# Patient Record
Sex: FEMALE | Race: BLACK OR AFRICAN AMERICAN | NOT HISPANIC OR LATINO
[De-identification: names, ages, dates, MRNs, and addresses within clinical notes are randomized per-mention and may not be internally consistent; named-entity substitution may affect disease eponyms.]

---

## 2018-09-14 ENCOUNTER — APPOINTMENT (OUTPATIENT)
Dept: SURGERY | Facility: CLINIC | Age: 38
End: 2018-09-14
Payer: COMMERCIAL

## 2018-09-14 VITALS
OXYGEN SATURATION: 99 % | TEMPERATURE: 99 F | RESPIRATION RATE: 16 BRPM | SYSTOLIC BLOOD PRESSURE: 101 MMHG | HEIGHT: 66 IN | BODY MASS INDEX: 24.91 KG/M2 | WEIGHT: 155 LBS | DIASTOLIC BLOOD PRESSURE: 62 MMHG | HEART RATE: 72 BPM

## 2018-09-14 PROCEDURE — 99244 OFF/OP CNSLTJ NEW/EST MOD 40: CPT

## 2018-10-30 ENCOUNTER — RECORD ABSTRACTING (OUTPATIENT)
Age: 38
End: 2018-10-30

## 2018-10-30 DIAGNOSIS — Z83.3 FAMILY HISTORY OF DIABETES MELLITUS: ICD-10-CM

## 2018-10-30 DIAGNOSIS — Z83.49 FAMILY HISTORY OF OTHER ENDOCRINE, NUTRITIONAL AND METABOLIC DISEASES: ICD-10-CM

## 2018-10-30 DIAGNOSIS — Z82.49 FAMILY HISTORY OF ISCHEMIC HEART DISEASE AND OTHER DISEASES OF THE CIRCULATORY SYSTEM: ICD-10-CM

## 2018-11-08 ENCOUNTER — APPOINTMENT (OUTPATIENT)
Dept: SURGERY | Facility: HOSPITAL | Age: 38
End: 2018-11-08

## 2018-11-08 ENCOUNTER — OTHER (OUTPATIENT)
Age: 38
End: 2018-11-08

## 2018-11-20 ENCOUNTER — OTHER (OUTPATIENT)
Age: 38
End: 2018-11-20

## 2018-11-29 ENCOUNTER — MEDICATION RENEWAL (OUTPATIENT)
Age: 38
End: 2018-11-29

## 2018-12-05 ENCOUNTER — APPOINTMENT (OUTPATIENT)
Dept: OBGYN | Facility: CLINIC | Age: 38
End: 2018-12-05
Payer: COMMERCIAL

## 2018-12-05 ENCOUNTER — LABORATORY RESULT (OUTPATIENT)
Age: 38
End: 2018-12-05

## 2018-12-05 VITALS
HEIGHT: 66 IN | BODY MASS INDEX: 26.2 KG/M2 | WEIGHT: 163 LBS | SYSTOLIC BLOOD PRESSURE: 116 MMHG | DIASTOLIC BLOOD PRESSURE: 62 MMHG

## 2018-12-05 DIAGNOSIS — R92.2 INCONCLUSIVE MAMMOGRAM: ICD-10-CM

## 2018-12-05 DIAGNOSIS — Z82.61 FAMILY HISTORY OF ARTHRITIS: ICD-10-CM

## 2018-12-05 DIAGNOSIS — Z80.9 FAMILY HISTORY OF MALIGNANT NEOPLASM, UNSPECIFIED: ICD-10-CM

## 2018-12-05 DIAGNOSIS — Z82.69 FAMILY HISTORY OF OTHER DISEASES OF THE MUSCULOSKELETAL SYSTEM AND CONNECTIVE TISSUE: ICD-10-CM

## 2018-12-05 DIAGNOSIS — Z84.89 FAMILY HISTORY OF OTHER SPECIFIED CONDITIONS: ICD-10-CM

## 2018-12-05 DIAGNOSIS — Z80.3 FAMILY HISTORY OF MALIGNANT NEOPLASM OF BREAST: ICD-10-CM

## 2018-12-05 DIAGNOSIS — T83.32XA DISPLACEMENT OF INTRAUTERINE CONTRACEPTIVE DEVICE, INITIAL ENCOUNTER: ICD-10-CM

## 2018-12-05 PROCEDURE — 99385 PREV VISIT NEW AGE 18-39: CPT

## 2018-12-10 LAB — CYTOLOGY CVX/VAG DOC THIN PREP: NORMAL

## 2018-12-14 ENCOUNTER — OTHER (OUTPATIENT)
Age: 38
End: 2018-12-14

## 2018-12-14 ENCOUNTER — APPOINTMENT (OUTPATIENT)
Dept: SURGERY | Facility: CLINIC | Age: 38
End: 2018-12-14

## 2018-12-17 PROBLEM — Z84.89 FAMILY HISTORY OF GENETIC DISORDER: Status: ACTIVE | Noted: 2018-09-14

## 2018-12-17 PROBLEM — Z82.61 FAMILY HISTORY OF ARTHRITIS: Status: ACTIVE | Noted: 2018-09-14

## 2018-12-17 PROBLEM — Z82.69 FAMILY HISTORY OF OSTEOARTHRITIS: Status: ACTIVE | Noted: 2018-09-14

## 2018-12-17 PROBLEM — Z80.9 FAMILY HISTORY OF MALIGNANT NEOPLASM: Status: ACTIVE | Noted: 2018-09-14

## 2018-12-18 ENCOUNTER — APPOINTMENT (OUTPATIENT)
Dept: ENDOCRINOLOGY | Facility: CLINIC | Age: 38
End: 2018-12-18

## 2019-02-21 ENCOUNTER — APPOINTMENT (OUTPATIENT)
Dept: INTERNAL MEDICINE | Facility: CLINIC | Age: 39
End: 2019-02-21

## 2019-02-25 ENCOUNTER — TRANSCRIPTION ENCOUNTER (OUTPATIENT)
Age: 39
End: 2019-02-25

## 2019-03-11 ENCOUNTER — APPOINTMENT (OUTPATIENT)
Dept: ENDOCRINOLOGY | Facility: CLINIC | Age: 39
End: 2019-03-11

## 2019-03-29 ENCOUNTER — APPOINTMENT (OUTPATIENT)
Dept: ENDOCRINOLOGY | Facility: CLINIC | Age: 39
End: 2019-03-29
Payer: COMMERCIAL

## 2019-03-29 VITALS
HEIGHT: 66 IN | BODY MASS INDEX: 27 KG/M2 | HEART RATE: 65 BPM | WEIGHT: 168 LBS | SYSTOLIC BLOOD PRESSURE: 100 MMHG | DIASTOLIC BLOOD PRESSURE: 60 MMHG

## 2019-03-29 PROCEDURE — 99213 OFFICE O/P EST LOW 20 MIN: CPT

## 2019-03-29 RX ORDER — METHIMAZOLE 10 MG/1
10 TABLET ORAL
Refills: 0 | Status: DISCONTINUED | COMMUNITY
End: 2019-03-29

## 2019-03-29 RX ORDER — AZITHROMYCIN 250 MG/1
250 TABLET, FILM COATED ORAL
Qty: 6 | Refills: 0 | Status: DISCONTINUED | COMMUNITY
Start: 2018-04-14 | End: 2019-03-29

## 2019-03-31 ENCOUNTER — LABORATORY RESULT (OUTPATIENT)
Age: 39
End: 2019-03-31

## 2019-04-03 ENCOUNTER — APPOINTMENT (OUTPATIENT)
Dept: OPHTHALMOLOGY | Facility: CLINIC | Age: 39
End: 2019-04-03
Payer: COMMERCIAL

## 2019-04-03 PROCEDURE — 92133 CPTRZD OPH DX IMG PST SGM ON: CPT

## 2019-04-03 PROCEDURE — 92083 EXTENDED VISUAL FIELD XM: CPT

## 2019-04-03 PROCEDURE — 99204 OFFICE O/P NEW MOD 45 MIN: CPT

## 2019-04-15 ENCOUNTER — APPOINTMENT (OUTPATIENT)
Dept: OPHTHALMOLOGY | Facility: CLINIC | Age: 39
End: 2019-04-15

## 2019-05-08 ENCOUNTER — APPOINTMENT (OUTPATIENT)
Dept: OBGYN | Facility: CLINIC | Age: 39
End: 2019-05-08

## 2019-05-23 ENCOUNTER — APPOINTMENT (OUTPATIENT)
Dept: ENDOCRINOLOGY | Facility: CLINIC | Age: 39
End: 2019-05-23

## 2019-05-28 ENCOUNTER — APPOINTMENT (OUTPATIENT)
Dept: INTERNAL MEDICINE | Facility: CLINIC | Age: 39
End: 2019-05-28

## 2019-06-03 ENCOUNTER — RX RENEWAL (OUTPATIENT)
Age: 39
End: 2019-06-03

## 2019-06-26 ENCOUNTER — NON-APPOINTMENT (OUTPATIENT)
Age: 39
End: 2019-06-26

## 2019-06-26 ENCOUNTER — APPOINTMENT (OUTPATIENT)
Dept: INTERNAL MEDICINE | Facility: CLINIC | Age: 39
End: 2019-06-26
Payer: COMMERCIAL

## 2019-06-26 VITALS
DIASTOLIC BLOOD PRESSURE: 70 MMHG | HEART RATE: 68 BPM | BODY MASS INDEX: 27.97 KG/M2 | TEMPERATURE: 99.6 F | SYSTOLIC BLOOD PRESSURE: 112 MMHG | WEIGHT: 174 LBS | HEIGHT: 66 IN

## 2019-06-26 DIAGNOSIS — Z86.39 PERSONAL HISTORY OF OTHER ENDOCRINE, NUTRITIONAL AND METABOLIC DISEASE: ICD-10-CM

## 2019-06-26 DIAGNOSIS — H40.003 PREGLAUCOMA, UNSPECIFIED, BILATERAL: ICD-10-CM

## 2019-06-26 PROCEDURE — 93000 ELECTROCARDIOGRAM COMPLETE: CPT

## 2019-06-26 PROCEDURE — 36415 COLL VENOUS BLD VENIPUNCTURE: CPT

## 2019-06-26 PROCEDURE — 99214 OFFICE O/P EST MOD 30 MIN: CPT | Mod: 25

## 2019-06-26 RX ORDER — PROPRANOLOL HYDROCHLORIDE 80 MG/1
80 CAPSULE, EXTENDED RELEASE ORAL
Refills: 0 | Status: DISCONTINUED | COMMUNITY
End: 2019-06-26

## 2019-06-28 LAB
APTT BLD: 32.3 SEC
BASOPHILS # BLD AUTO: 0.03 K/UL
BASOPHILS NFR BLD AUTO: 0.5 %
EOSINOPHIL # BLD AUTO: 0.16 K/UL
EOSINOPHIL NFR BLD AUTO: 2.6 %
HCT VFR BLD CALC: 29.6 %
HGB BLD-MCNC: 9.2 G/DL
IMM GRANULOCYTES NFR BLD AUTO: 0.2 %
INR PPP: 0.91 RATIO
LYMPHOCYTES # BLD AUTO: 3.15 K/UL
LYMPHOCYTES NFR BLD AUTO: 50.8 %
MAN DIFF?: NORMAL
MCHC RBC-ENTMCNC: 28.9 PG
MCHC RBC-ENTMCNC: 31.1 GM/DL
MCV RBC AUTO: 93.1 FL
MONOCYTES # BLD AUTO: 0.33 K/UL
MONOCYTES NFR BLD AUTO: 5.3 %
NEUTROPHILS # BLD AUTO: 2.52 K/UL
NEUTROPHILS NFR BLD AUTO: 40.6 %
PLATELET # BLD AUTO: 381 K/UL
PT BLD: 10.4 SEC
RBC # BLD: 3.18 M/UL
RBC # FLD: 14.3 %
WBC # FLD AUTO: 6.2 K/UL

## 2019-06-28 NOTE — ASSESSMENT
[Patient Optimized for Surgery] : Patient optimized for surgery [No Further Testing Recommended] : no further testing recommended [As per surgery] : as per surgery [FreeTextEntry4] : Ms. CHRISTY is a 39 year yo female with no h/o CAD and good exercise tolerance. She denies CP, palpitation or SOB and her EKG today is normal. She does not take blood thinners and denies sleep apnea or urinary obstruction symptoms. She does not have a h/o DVT. She will continue the prescription medications perioperatively as instructed. The morning of the procedure she will not take any pills\par \par Ms. CHRISTY has a moderate risk for perioperative cardiovascular complications and will undergo the procedure as planned. I will follow her postop.\par \par  ***More than 50% of the face to face time was devoted to counseling and/or coordination of care. The discussion and/or coordination of care included: perioperative medication management.\par \par

## 2019-06-28 NOTE — ADDENDUM
[FreeTextEntry1] : hgb 9.2 , ok for ambulatory surgery /low risk procedure\par will f/u c me after procedure

## 2019-06-28 NOTE — PHYSICAL EXAM
[Normal] : normal gait, coordination grossly intact, no focal deficits [de-identified] : skin rash neck [de-identified] : bilat exophtalmos and R upper lid lag

## 2019-06-28 NOTE — REVIEW OF SYSTEMS
[Skin Rash] : skin rash [Negative] : Neurological [Itching] : no itching [FreeTextEntry3] : b [de-identified] : dry skin

## 2019-06-28 NOTE — HISTORY OF PRESENT ILLNESS
[No Pertinent Cardiac History] : no history of aortic stenosis, atrial fibrillation, coronary artery disease, recent myocardial infarction, or implantable device/pacemaker [No Pertinent Pulmonary History] : no history of asthma, COPD, sleep apnea, or smoking [No Adverse Anesthesia Reaction] : no adverse anesthesia reaction in self or family member [(Patient denies any chest pain, claudication, dyspnea on exertion, orthopnea, palpitations or syncope)] : Patient denies any chest pain, claudication, dyspnea on exertion, orthopnea, palpitations or syncope [Good (7-10 METs)] : Good (7-10 METs) [Chronic Anticoagulation] : no chronic anticoagulation [Chronic Kidney Disease] : no chronic kidney disease [Diabetes] : no diabetes [FreeTextEntry1] : eyelid surgery [FreeTextEntry3] : Dr Yeager [FreeTextEntry2] : 7/9/19 [FreeTextEntry4] : Erlin comes in today for preoperative evaluation prior to another intervention on the right upper eyelid to correct exophthalmos and lid lag  in the context of Graves dz\par She had thyroidectomy last year and now she is on Synthroid. She feels well

## 2019-07-29 ENCOUNTER — LABORATORY RESULT (OUTPATIENT)
Age: 39
End: 2019-07-29

## 2019-07-29 ENCOUNTER — APPOINTMENT (OUTPATIENT)
Dept: ENDOCRINOLOGY | Facility: CLINIC | Age: 39
End: 2019-07-29
Payer: COMMERCIAL

## 2019-07-29 VITALS
SYSTOLIC BLOOD PRESSURE: 110 MMHG | BODY MASS INDEX: 27.32 KG/M2 | WEIGHT: 170 LBS | HEIGHT: 66 IN | HEART RATE: 63 BPM | DIASTOLIC BLOOD PRESSURE: 78 MMHG

## 2019-07-29 DIAGNOSIS — Z00.00 ENCOUNTER FOR GENERAL ADULT MEDICAL EXAMINATION W/OUT ABNORMAL FINDINGS: ICD-10-CM

## 2019-07-29 PROCEDURE — 99214 OFFICE O/P EST MOD 30 MIN: CPT

## 2019-07-29 NOTE — HISTORY OF PRESENT ILLNESS
[FreeTextEntry1] : \par July 29, 2019\par \par PCP: Dr. Nalini Noel \par  Gyn: [facility] \par  Surgeon: Dr. Kyle Melchor \par  Eyes:  Dr. Berry Henderson - ref to glaucoma doctor as well as at [facility] for lid retraction OS and will go to [facility] Nov 8, 2019\par .\par  CC: Hyperthyroid (Graves' disease with ophthalmopathy) \par  11/9/2018: thyroidectomy \par \par 38 yo working night shift at [employer].  \par Now taking levothyroxine 125 mcg daily.\par No double vision, blurry vision   \par \par Impression:  Thyroid surgery was November and by March TSH was 2.57 on levothyroxine\par 125 mcg daily.\par \par Plan:  TSI, TBII, TFTs today and ROV in six months.   \par \par \par \par \par \par March 29, 2019\par \par  PCP: Dr. Nalini Noel (to see)\par  Gyn: [facility] (to see)\par  Surgeon: Dr. Kyle Melchor \par .\par  CC: Hyperthyroid (Graves' disease with ophthalmopathy) \par  11/9/2018: thyroidectomy \par \par On levothyroxine 125 mcg daily post thyroidectomy in setting of hyperthyroidism due to Graves' Disease with ophthalmopathy. \par \par November 9, 2018:\par  "FINAL DIAGNOSIS\par  \par A. Delphian lymph node, excision:\par 	One benign reactive lymph node.\par \par B. Thyroid gland, total thyroidectomy:\par 	Thyroid tissue showing areas of mild hyperplastic change, mild fibrosis and few\par  minute hyperplastic-type nodules, consistent with treated diffuse hyperplasia\par  (Graves disease).	\par :"\par \par Impression: Graves' disease with ophthalmopathy post thyroidectomy.\par \par Plan: UPdated thyroid lab tests on April 1.\par See Dr. Berry Bah for her advice. \par ROV here in about 8 weeks.\par \par \par \par \par \par Previous notes from [employer] appended below.\par \par \par October 19, 2018\par .\par  PCP: Dr. Nalini Noel (to see)\par  Gyn: [facility] (to see)\par  Surgeon: Dr. Kyle Melchor \par .\par  CC: Hyperthyroid (Graves' disease with ophthalmopathy) \par .\par  37 yo on methimazole (started 7/19/2018) for hyperthyroidism due to Graves' disease. Because of her history of ophthalmopathy, she is not a good candidate for I-131 treatment and she is not likely to go into a prolonged remission from methimazole now that the condition has returned. Thus she met with Dr. Melchor for his opinion regarding thyroidectomy and she is scheduled for surgery on November 8. \par .\par  Currently taking methimazole 10 mg daily.\par .\par  Impression: TFTs in good range, although recent TSH still suppressed.\par .\par  Plan: Increase the methimazole to 10 mg x 2 tabs daily for now until surgery. TFTs a few days before surgery and call me for results. \par .\par  ==\par .\par  Sept 14, 2018\par .\par  PCP: Dr. Nalini Noel (to see)\par  [facility]yn: [facility] (to see)\par  Surgeon: Dr. Kyle Melchor (to see)\par .\par  Taking methimazole 10 mg daily. (started 7/19/2018)\par  Feels well.\par  Because of the eye symptoms from Graves' disease, she is not a good candidate for I-131 and she is not likely to go into a remission and she is interested in being considered for thyroid surgery.\par  She is scheduled to meet with Dr. Melchor later today. \par .\par  Labs on\par  8/18 included T3 135 which is down to 108 by 9/9\par  T4 on 8/18 was 11 and is now 9.2 by 9/9\par  TSH remains suppressed.\par .\par  Impression: Doing well on methimazole 10 mg daily and propranolol 5 mg BID\par .\par  Plan: TFTs before next visit here in one month. \par .\par  ==\par  /\par  August 17, 2018\par .\par  PCP: Dr. Nalini Noel (to see)\par  Gyn: [facility] (to see)\par .\par  37 yo - mother of two (8 boy, 9 yo girl), currently working as RN at [employer], commutes from [place]\par .\par  Born in [place], to [place] 1999 to [place], \par  2008, , moved to [place].\par .\par  2015 traveling back and forth between [facility] and [place]. 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 Saw ophthalmologist in [facility] and went to endocrinologist in [facility]. Put on prednisone, antithyroid medication and "BP" med. After a few months, her R eye was still proptotic. \par  Sought medical advice in [place] at [facility]. \par .\par  Saw Dr. Reji Mchugh at [facility]. Started on methimazole 10 mg daily and then tapered off and was able to stop about a year ago.\par  She underwent R eye surgery. by Dr. Brooks.. Dr. Brooks also lowered the eyelid - she did not want additional surgery. \par .\par  Now she notices some prominence of Left eye and Dr. Brooks gave her botox to lower L lid. \par .\par  [facility] in [address] p  and had TFTs about 3 weeks ago and was told that the overactivity had returned. TSH was 0.01, free T4 was 2.3 (0.8 - 1.8, total T3 162 (). \par .\par  She has been taking methiamzole 10 mg daily since 7/19/2018. By Dr. Alonso Branch of the [facility].\par .\par  Impression: She is aware that she is not a good candidate for I-131. She also knows that she is not likely to go into a prolonged remission from the hyperthyroidism from methimazole. Thus she says she is interested to obtain a surgical opinion.\par .\par  Plan: She will continue the methimazole 10 mg daily for now and I reviewed with her again potential side effects. She will have updated lab tests at [employer] tomorrow and she will arrange for an ultrasound of the thyroid at [employer]. She will call for the results of her studies in several days and return here after that. She will meet with Dr. Melchor on September 14 for his opinion on her management. \par  \par \par \par

## 2019-07-29 NOTE — ASSESSMENT
[FreeTextEntry1] : &\par Hypothyroidism post total thyroidectomy for hyperthyroidism due to Graves disease doing well on levothyroxine 125 mcg daily.\par Following up to Ophthalmology for ophthalmopathy.  Never treated with Se.\par \par Updated labs today and ROV 6 months.

## 2019-07-29 NOTE — PHYSICAL EXAM
[Alert] : alert [No Acute Distress] : no acute distress [Well Nourished] : well nourished [Well Developed] : well developed [Healthy Appearance] : healthy appearance [Normal Voice/Communication] : normal voice communication [Normal Outer Ear/Nose] : the ears and nose were normal in appearance [Supple] : the neck was supple [Thyroid Not Enlarged] : the thyroid was not enlarged [No Thyroid Nodules] : there were no palpable thyroid nodules [Well Healed Scar] : well healed scar [No Respiratory Distress] : no respiratory distress [Normal Rate and Effort] : normal respiratory rhythm and effort [Normal Rate] : heart rate was normal  [Regular Rhythm] : with a regular rhythm [No Stigmata of Cushings Syndrome] : no stigmata of cushings syndrome [Oriented x3] : oriented to person, place, and time [Normal Insight/Judgement] : insight and judgment were intact [Normal Affect] : the affect was normal [Normal Mood] : the mood was normal [de-identified] : Positive stare, lid lag and proptosis

## 2019-08-03 LAB
ANION GAP SERPL CALC-SCNC: 15 MMOL/L
BUN SERPL-MCNC: 15 MG/DL
CALCIUM SERPL-MCNC: 9.2 MG/DL
CHLORIDE SERPL-SCNC: 103 MMOL/L
CO2 SERPL-SCNC: 24 MMOL/L
CREAT SERPL-MCNC: 0.85 MG/DL
GLUCOSE SERPL-MCNC: 103 MG/DL
POTASSIUM SERPL-SCNC: 4.2 MMOL/L
SODIUM SERPL-SCNC: 142 MMOL/L
T3 SERPL-MCNC: 58 NG/DL
T3RU NFR SERPL: 1.1 TBI
T4 SERPL-MCNC: 6.9 UG/DL
THYROGLOB AB SERPL-ACNC: <20 IU/ML
THYROPEROXIDASE AB SERPL IA-ACNC: <10 IU/ML
TSH RECEPTOR AB: <1.1 IU/L
TSH SERPL-ACNC: 34.6 UIU/ML
TSI ACT/NOR SER: 0.21 IU/L

## 2019-10-10 ENCOUNTER — APPOINTMENT (OUTPATIENT)
Dept: INTERNAL MEDICINE | Facility: CLINIC | Age: 39
End: 2019-10-10
Payer: COMMERCIAL

## 2019-10-10 VITALS
SYSTOLIC BLOOD PRESSURE: 130 MMHG | DIASTOLIC BLOOD PRESSURE: 70 MMHG | WEIGHT: 162 LBS | HEIGHT: 66 IN | HEART RATE: 68 BPM | BODY MASS INDEX: 26.03 KG/M2 | TEMPERATURE: 98.4 F

## 2019-10-10 DIAGNOSIS — Z01.818 ENCOUNTER FOR OTHER PREPROCEDURAL EXAMINATION: ICD-10-CM

## 2019-10-10 PROCEDURE — G0008: CPT

## 2019-10-10 PROCEDURE — 36415 COLL VENOUS BLD VENIPUNCTURE: CPT

## 2019-10-10 PROCEDURE — 90674 CCIIV4 VAC NO PRSV 0.5 ML IM: CPT

## 2019-10-10 PROCEDURE — 99214 OFFICE O/P EST MOD 30 MIN: CPT | Mod: 25

## 2019-10-14 LAB
APTT BLD: 31.6 SEC
BASOPHILS # BLD AUTO: 0.02 K/UL
BASOPHILS NFR BLD AUTO: 0.4 %
EOSINOPHIL # BLD AUTO: 0.06 K/UL
EOSINOPHIL NFR BLD AUTO: 1.2 %
HCT VFR BLD CALC: 29.8 %
HGB BLD-MCNC: 9.4 G/DL
IMM GRANULOCYTES NFR BLD AUTO: 0.2 %
INR PPP: 0.95 RATIO
LYMPHOCYTES # BLD AUTO: 2.45 K/UL
LYMPHOCYTES NFR BLD AUTO: 47.5 %
MAN DIFF?: NORMAL
MCHC RBC-ENTMCNC: 29.5 PG
MCHC RBC-ENTMCNC: 31.5 GM/DL
MCV RBC AUTO: 93.4 FL
MONOCYTES # BLD AUTO: 0.39 K/UL
MONOCYTES NFR BLD AUTO: 7.6 %
NEUTROPHILS # BLD AUTO: 2.23 K/UL
NEUTROPHILS NFR BLD AUTO: 43.1 %
PLATELET # BLD AUTO: 399 K/UL
PT BLD: 10.8 SEC
RBC # BLD: 3.19 M/UL
RBC # FLD: 14.2 %
WBC # FLD AUTO: 5.16 K/UL

## 2019-11-06 NOTE — PHYSICAL EXAM
[Normal] : normal gait, coordination grossly intact, no focal deficits [de-identified] : bilat exophtalmos and L upper lid lag

## 2019-11-06 NOTE — REVIEW OF SYSTEMS
[Negative] : Heme/Lymph [Itching] : no itching [Skin Rash] : no skin rash [FreeTextEntry3] : exophtalmos

## 2019-11-06 NOTE — HISTORY OF PRESENT ILLNESS
[No Pertinent Cardiac History] : no history of aortic stenosis, atrial fibrillation, coronary artery disease, recent myocardial infarction, or implantable device/pacemaker [No Pertinent Pulmonary History] : no history of asthma, COPD, sleep apnea, or smoking [No Adverse Anesthesia Reaction] : no adverse anesthesia reaction in self or family member [(Patient denies any chest pain, claudication, dyspnea on exertion, orthopnea, palpitations or syncope)] : Patient denies any chest pain, claudication, dyspnea on exertion, orthopnea, palpitations or syncope [Good (7-10 METs)] : Good (7-10 METs) [Chronic Anticoagulation] : no chronic anticoagulation [Chronic Kidney Disease] : no chronic kidney disease [Diabetes] : no diabetes [FreeTextEntry1] : L eyelid surgery [FreeTextEntry2] : 11/8/19 [FreeTextEntry3] : Dr Yeager [FreeTextEntry4] : Erlin comes in today for preoperative evaluation prior to another intervention on the Left  upper eyelid to correct exophthalmos and lid lag  in the context of Graves dz\par She had thyroidectomy last year and now she is on Synthroid. She feels well.

## 2019-11-06 NOTE — ASSESSMENT
[As per surgery] : as per surgery [Patient Optimized for Surgery] : Patient optimized for surgery [No Further Testing Recommended] : no further testing recommended [FreeTextEntry4] : Ms. CHRISTY is a 39 year yo female with no h/o CAD and good exercise tolerance. She denies CP, palpitation or SOB and her EKG in June 2019 was normal. She does not take blood thinners and denies sleep apnea or urinary obstruction symptoms. She does not have a h/o DVT. She will continue the prescription medications perioperatively as instructed. The morning of the procedure she will not take any pills\par \par Ms. CHRISTY has a low risk for perioperative cardiovascular complications and will undergo the ambulatory procedure as planned.\par \par  ***More than 50% of the face to face time was devoted to counseling and/or coordination of care. The discussion and/or coordination of care included: perioperative medication management.\par \par

## 2019-11-29 ENCOUNTER — APPOINTMENT (OUTPATIENT)
Dept: INTERNAL MEDICINE | Facility: CLINIC | Age: 39
End: 2019-11-29
Payer: COMMERCIAL

## 2019-11-29 VITALS — TEMPERATURE: 98.6 F

## 2019-11-29 PROCEDURE — 90471 IMMUNIZATION ADMIN: CPT

## 2019-11-29 PROCEDURE — 90734 MENACWYD/MENACWYCRM VACC IM: CPT

## 2020-01-13 ENCOUNTER — RESULT REVIEW (OUTPATIENT)
Age: 40
End: 2020-01-13

## 2020-01-15 ENCOUNTER — APPOINTMENT (OUTPATIENT)
Dept: OBGYN | Facility: CLINIC | Age: 40
End: 2020-01-15
Payer: COMMERCIAL

## 2020-01-15 VITALS
WEIGHT: 159 LBS | DIASTOLIC BLOOD PRESSURE: 80 MMHG | SYSTOLIC BLOOD PRESSURE: 122 MMHG | BODY MASS INDEX: 25.55 KG/M2 | HEIGHT: 66 IN

## 2020-01-15 DIAGNOSIS — Z01.419 ENCOUNTER FOR GYNECOLOGICAL EXAMINATION (GENERAL) (ROUTINE) W/OUT ABNORMAL FINDINGS: ICD-10-CM

## 2020-01-15 PROCEDURE — 99396 PREV VISIT EST AGE 40-64: CPT

## 2020-01-16 NOTE — PHYSICAL EXAM
[Awake] : awake [Alert] : alert [Mass] : no breast mass [Acute Distress] : no acute distress [Axillary LAD] : no axillary lymphadenopathy [Nipple Discharge] : no nipple discharge [Soft] : soft [Tender] : non tender [Oriented x3] : oriented to person, place, and time [Normal] : vagina [No Bleeding] : there was no active vaginal bleeding [Uterine Adnexae] : were not tender and not enlarged [FreeTextEntry7] : Very enlarged fibroid utx/ tender on palpation

## 2020-01-27 DIAGNOSIS — D25.9 LEIOMYOMA OF UTERUS, UNSPECIFIED: ICD-10-CM

## 2020-02-03 DIAGNOSIS — N64.89 OTHER SPECIFIED DISORDERS OF BREAST: ICD-10-CM

## 2020-02-06 ENCOUNTER — RESULT REVIEW (OUTPATIENT)
Age: 40
End: 2020-02-06

## 2020-02-11 LAB
CYTOLOGY CVX/VAG DOC THIN PREP: ABNORMAL
HPV HIGH+LOW RISK DNA PNL CVX: NOT DETECTED

## 2020-04-07 ENCOUNTER — APPOINTMENT (OUTPATIENT)
Dept: ENDOCRINOLOGY | Facility: CLINIC | Age: 40
End: 2020-04-07

## 2020-04-13 ENCOUNTER — APPOINTMENT (OUTPATIENT)
Dept: ENDOCRINOLOGY | Facility: CLINIC | Age: 40
End: 2020-04-13
Payer: COMMERCIAL

## 2020-04-13 PROCEDURE — 99214 OFFICE O/P EST MOD 30 MIN: CPT

## 2020-04-13 NOTE — HISTORY OF PRESENT ILLNESS
[Home] : at home, [unfilled] , at the time of the visit. [Medical Office: ([facility])___] : at ~his/her~ medical office located in [place] [Patient] : the patient [Self] : self [FreeTextEntry1] : Apr 13, 2020\par \par This is a 21+ minute Tele-Phonic encounter with an established patient which was initiated by the patient during a time scheduled for a visit and the patient is aware that this may be a billable encounter.  The patient has not seen a provider of my specialty (Endocrinology) within out group in the past 7 days and this encounter is not anticipated to result in a scheduled in-person visit within he next 7 days.\par The reason for this Tele-Phonic encounter is listed below under "CC:"\par Verbal consent was discussed and obtained from the patient for this visit:  "You have chosen to receive care through the use of tele-media or telephone.   This enables health care providers at different locations to provide safe, effective, and convenient care through the use of technology.  Please note this is a billable encounter.  As with any health care service, there are risks associated with the use of tele-media or telephone, including equipment failure, poor image and/or resolution, and  issues.  You understand that I cannot physically examine you and that you may need to come to the office to complete the assessment.\par \par Patient agreed verbally to understanding the risks, benefits, alternatives of Tele-Media and telephone as explained.  All questions regarding tele-media and telephone encounters were answered.\par \par Apr 13, 2020\par \par .\par  CC: Hyperthyroid (Graves' disease with ophthalmopathy) \par  11/9/2018: thyroidectomy \par \par Taking levothyroxine 137 mcg daily from [facility].\par Feels well.\par Last set of TFTs July 2019 in good range.\par \par Plan:  Updated labs at [facility] this week.\par Renew LT4\par ROV 8 months.  \par \par July 29, 2019\par \par PCP: Dr. Nalini Noel \par  Gyn: [facility] \par  Surgeon: Dr. Klye Melchor \par  Eyes:  Dr. Berry Henderson - ref to glaucoma doctor as well as at [facility] for lid retraction OS and will go to [place] [facility] Nov 8, 2019\par .\par  CC: Hyperthyroid (Graves' disease with ophthalmopathy) \par  11/9/2018: thyroidectomy \par \par 40 yo working night shift at [facility] [employer].  \par Now taking levothyroxine 125 mcg daily.\par No double vision, blurry vision   \par \par Impression:  Thyroid surgery was November and by March TSH was 2.57 on levothyroxine\par 125 mcg daily.\par \par Plan:  TSI, TBII, TFTs today and ROV in six months.   \par \par \par \par \par \par March 29, 2019\par \par  PCP: Dr. Nalini Noel (to see)\par  Gyn: [facility] (to see)\par  Surgeon: Dr. Kyle Melchor \par .\par  CC: Hyperthyroid (Graves' disease with ophthalmopathy) \par  11/9/2018: thyroidectomy \par \par On levothyroxine 125 mcg daily post thyroidectomy in setting of hyperthyroidism due to Graves' Disease with ophthalmopathy. \par \par November 9, 2018:\par  "FINAL DIAGNOSIS\par  \par A. Delphian lymph node, excision:\par 	One benign reactive lymph node.\par \par B. Thyroid gland, total thyroidectomy:\par 	Thyroid tissue showing areas of mild hyperplastic change, mild fibrosis and few\par  minute hyperplastic-type nodules, consistent with treated diffuse hyperplasia\par  (Graves disease).	\par :"\par \par Impression: Graves' disease with ophthalmopathy post thyroidectomy.\par \par Plan: UPdated thyroid lab tests on April 1.\par See Dr. Berry Bah for her advice. \par ROV here in about 8 weeks.\par \par \par \par \par \par Previous notes from [employer] appended below.\par \par \par October 19, 2018\par .\par  PCP: Dr. Nalini Noel (to see)\par  Gyn: [facility] (to see)\par  Surgeon: Dr. Kyle Melchor \par .\par  CC: Hyperthyroid (Graves' disease with ophthalmopathy) \par .\par  37 yo on methimazole (started 7/19/2018) for hyperthyroidism due to Graves' disease. Because of her history of ophthalmopathy, she is not a good candidate for I-131 treatment and she is not likely to go into a prolonged remission from methimazole now that the condition has returned. Thus she met with Dr. Melchor for his opinion regarding thyroidectomy and she is scheduled for surgery on November 8. \par .\par  Currently taking methimazole 10 mg daily.\par .\par  Impression: TFTs in good range, although recent TSH still suppressed.\par .\par  Plan: Increase the methimazole to 10 mg x 2 tabs daily for now until surgery. TFTs a few days before surgery and call me for results. \par .\par  ==\par .\par  Sept 14, 2018\par .\par  PCP: Dr. Nalini Noel (to see)\par  Gyn: [facility] (to see)\par  Surgeon: Dr. Kyle Melchor (to see)\par .\par  Taking methimazole 10 mg daily. (started 7/19/2018)\par  Feels well.\par  Because of the eye symptoms from Graves' disease, she is not a good candidate for I-131 and she is not likely to go into a remission and she is interested in being considered for thyroid surgery.\par  She is scheduled to meet with Dr. Melchor later today. \par .\par  Labs on\par  8/18 included T3 135 which is down to 108 by 9/9\par  T4 on 8/18 was 11 and is now 9.2 by 9/9\par  TSH remains suppressed.\par .\par  Impression: Doing well on methimazole 10 mg daily and propranolol 5 mg BID\par .\par  Plan: TFTs before next visit here in one month. \par .\par  ==\par  /\par  August 17, 2018\par .\par  PCP: Dr. 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 Saw ophthalmologist in [facility] and went to endocrinologist in [facility]. Put on prednisone, antithyroid medication and "BP" med. After a few months, her R eye was still proptotic. \par  Sought medical advice in [place] at [facility]. \par .\par  Saw Dr. Reji Mchugh at [facility]. Started on methimazole 10 mg daily and then tapered off and was able to stop about a year ago.\par  She underwent R eye surgery. by Dr. Brooks.. Dr. Brooks also lowered the eyelid - she did not want additional surgery. \par .\par  Now she notices some prominence of Left eye and Dr. Brooks gave her botox to lower L lid. \par .\par  [facility] in [address] p  and had TFTs about 3 weeks ago and was told that the overactivity had returned. TSH was 0.01, free T4 was 2.3 (0.8 - 1.8, total T3 162 (). \par .\par  She has been taking methiamzole 10 mg daily since 7/19/2018. By Dr. Alonso Branch of the [facility].\par .\par  Impression: She is aware that she is not a good candidate for I-131. She also knows that she is not likely to go into a prolonged remission from the hyperthyroidism from methimazole. Thus she says she is interested to obtain a surgical opinion.\par .\par  Plan: She will continue the methimazole 10 mg daily for now and I reviewed with her again potential side effects. She will have updated lab tests at [facility] tomorrow and she will arrange for an ultrasound of the thyroid at [facility]. She will call for the results of her studies in several days and return here after that. She will meet with Dr. Melchor on September 14 for his opinion on her management. \par  \par \par \par

## 2020-04-13 NOTE — ASSESSMENT
[FreeTextEntry1] : Hypothyroid post thyroidectomy for hyperthyroidism due to Graves disease, doing well on levothyroxine 137 mcg daily.\par Updated labs this week and ROV 8 months

## 2020-04-25 ENCOUNTER — MESSAGE (OUTPATIENT)
Age: 40
End: 2020-04-25

## 2020-05-06 LAB
SARS-COV-2 IGG SERPL IA-ACNC: <0.1 INDEX
SARS-COV-2 IGG SERPL QL IA: NEGATIVE

## 2020-05-17 LAB
ANION GAP SERPL CALC-SCNC: 13 MMOL/L
BUN SERPL-MCNC: 10 MG/DL
CALCIUM SERPL-MCNC: 9.7 MG/DL
CHLORIDE SERPL-SCNC: 103 MMOL/L
CO2 SERPL-SCNC: 25 MMOL/L
CREAT SERPL-MCNC: 0.83 MG/DL
GLUCOSE SERPL-MCNC: 82 MG/DL
POTASSIUM SERPL-SCNC: 4.5 MMOL/L
SODIUM SERPL-SCNC: 141 MMOL/L
T3 SERPL-MCNC: 73 NG/DL
T4 SERPL-MCNC: 8.4 UG/DL
TSH SERPL-ACNC: 25.2 UIU/ML

## 2020-05-20 ENCOUNTER — APPOINTMENT (OUTPATIENT)
Dept: ENDOCRINOLOGY | Facility: CLINIC | Age: 40
End: 2020-05-20
Payer: COMMERCIAL

## 2020-05-20 PROCEDURE — 99214 OFFICE O/P EST MOD 30 MIN: CPT | Mod: 95

## 2020-05-20 NOTE — HISTORY OF PRESENT ILLNESS
[Home] : at home, [unfilled] , at the time of the visit. [Medical Office: ([facility])___] : at the medical office located in  [Verbal consent obtained from patient] : the patient, [unfilled] [FreeTextEntry1] : May 20, 2020    VideoChat - Facetime to \par \par PCP:  Dr. Nalini Noel\par \par CC:  Hypothyroid post thyroidectomy for Graves Disease.\par \par Feels well.\par TSH is elevated.\par Needs dose of levothyroxine increased.  \par \par Rx sent to [facility] at [address]\par She will ROV end of July after [facility] TFTs a few days before\par \par \par \par \par \par Apr 13, 2020\par \par This is a 21+ minute Tele-Phonic encounter with an established patient which was initiated by the patient during a time scheduled for a visit and the patient is aware that this may be a billable encounter.  The patient has not seen a provider of my specialty (Endocrinology) within out group in the past 7 days and this encounter is not anticipated to result in a scheduled in-person visit within he next 7 days.\par The reason for this Tele-Phonic encounter is listed below under "CC:"\par Verbal consent was discussed and obtained from the patient for this visit:  "You have chosen to receive care through the use of tele-media or telephone.   This enables health care providers at different locations to provide safe, effective, and convenient care through the use of technology.  Please note this is a billable encounter.  As with any health care service, there are risks associated with the use of tele-media or telephone, including equipment failure, poor image and/or resolution, and  issues.  You understand that I cannot physically examine you and that you may need to come to the office to complete the assessment.\par \par Patient agreed verbally to understanding the risks, benefits, alternatives of Tele-Media and telephone as explained.  All questions regarding tele-media and telephone encounters were answered.\par \par Apr 13, 2020\par \par .\par  CC: Hyperthyroid (Graves' disease with ophthalmopathy) \par  11/9/2018: thyroidectomy \par \par Taking levothyroxine 137 mcg daily from [facility] [address].\par Feels well.\par Last set of TFTs July 2019 in good range.\par \par Plan:  Updated labs at [facility] this week.\par Renew LT4\par ROV 8 months.  \par \par July 29, 2019\par \par PCP: Dr. Nalini Noel \par  Gyn: [facility] \par  Surgeon: Dr. Kyle Melchor \par  Eyes:  Dr. Berry Henderson - ref to glaucoma doctor as well as at [facility] for lid retraction OS and will go to [place] Nov 8, 2019\par .\par  CC: Hyperthyroid (Graves' disease with ophthalmopathy) \par  11/9/2018: thyroidectomy \par \par 40 yo working night shift at [facility] [employer].  \par Now taking levothyroxine 125 mcg daily.\par No double vision, blurry vision   \par \par Impression:  Thyroid surgery was November and by March TSH was 2.57 on levothyroxine\par 125 mcg daily.\par \par Plan:  TSI, TBII, TFTs today and ROV in six months.   \par \par \par \par \par \par March 29, 2019\par \par  PCP: Dr. Nalini Noel (to see)\par  Gyn: [facility] (to see)\par  Surgeon: Dr. Kyle Melchor \par .\par  CC: Hyperthyroid (Graves' disease with ophthalmopathy) \par  11/9/2018: thyroidectomy \par \par On levothyroxine 125 mcg daily post thyroidectomy in setting of hyperthyroidism due to Graves' Disease with ophthalmopathy. \par \par November 9, 2018:\par  "FINAL DIAGNOSIS\par  \par A. Delphian lymph node, excision:\par 	One benign reactive lymph node.\par \par B. Thyroid gland, total thyroidectomy:\par 	Thyroid tissue showing areas of mild hyperplastic change, mild fibrosis and few\par  minute hyperplastic-type nodules, consistent with treated diffuse hyperplasia\par  (Graves disease).	\par :"\par \par Impression: Graves' disease with ophthalmopathy post thyroidectomy.\par \par Plan: UPdated thyroid lab tests on April 1.\par See Dr. Berry Bah for her advice. \par ROV here in about 8 weeks.\par \par \par \par \par \par Previous notes from [employer] appended below.\par \par \par October 19, 2018\par .\par  PCP: Dr. Nalini Noel (to see)\par  Gyn: [facility] (to see)\par  Surgeon: Dr. Kyle Melchor \par .\par  CC: Hyperthyroid (Graves' disease with ophthalmopathy) \par .\par  39 yo on methimazole (started 7/19/2018) for hyperthyroidism due to Graves' disease. Because of her history of ophthalmopathy, she is not a good candidate for I-131 treatment and she is not likely to go into a prolonged remission from methimazole now that the condition has returned. Thus she met with Dr. Melchor for his opinion regarding thyroidectomy and she is scheduled for surgery on November 8. \par .\par  Currently taking methimazole 10 mg daily.\par .\par  Impression: TFTs in good range, although recent TSH still suppressed.\par .\par  Plan: Increase the methimazole to 10 mg x 2 tabs daily for now until surgery. TFTs a few days before surgery and call me for results. \par .\par  ==\par .\par  Sept 14, 2018\par .\par  PCP: Dr. Nalini Noel (to see)\par  Gyn: [facility] (to see)\par  Surgeon: Dr. Kyle Melchor (to see)\par .\par  Taking methimazole 10 mg daily. (started 7/19/2018)\par  Feels well.\par  Because of the eye symptoms from Graves' disease, she is not a good candidate for I-131 and she is not likely to go into a remission and she is interested in being considered for thyroid surgery.\par  She is scheduled to meet with Dr. Melchor later today. \par .\par  Labs on\par  8/18 included T3 135 which is down to 108 by 9/9\par  T4 on 8/18 was 11 and is now 9.2 by 9/9\par  TSH remains suppressed.\par .\par  Impression: Doing well on methimazole 10 mg daily and propranolol 5 mg BID\par .\par  Plan: TFTs before next visit here in one month. \par .\par  ==\par  /\par  August 17, 2018\par .\par  PCP: Dr. 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 Saw ophthalmologist in [facility] and went to endocrinologist in [facility]. Put on prednisone, antithyroid medication and "BP" med. After a few months, her R eye was still proptotic. \par  Sought medical advice in [place] at [facility]. \par .\par  Saw Dr. Reji Mchugh at [facility]. Started on methimazole 10 mg daily and then tapered off and was able to stop about a year ago.\par  She underwent R eye surgery. by Dr. Brooks.. Dr. Brooks also lowered the eyelid - she did not want additional surgery. \par .\par  Now she notices some prominence of Left eye and Dr. Brooks gave her botox to lower L lid. \par .\par  [facility] [address]  and had TFTs about 3 weeks ago and was told that the overactivity had returned. TSH was 0.01, free T4 was 2.3 (0.8 - 1.8, total T3 162 (). \par .\par  She has been taking methiamzole 10 mg daily since 7/19/2018. By Dr[facility]onso [facility].\par .\par  Impression: She is aware that she is not a good candidate for I-131. She also knows that she is not likely to go into a prolonged remission from the hyperthyroidism from methimazole. Thus she says she is interested to obtain a surgical opinion.\par .\par  Plan: She will continue the methimazole 10 mg daily for now and I reviewed with her again potential side effects. She will have updated lab tests at [facility] tomorrow and she will arrange for an ultrasound of the thyroid at [facility]. She will call for the results of her studies in several days and return here after that. She will meet with Dr. Melchor on September 14 for his opinion on her management. \par  \par \par \par

## 2020-05-20 NOTE — ASSESSMENT
[FreeTextEntry1] : Hypothyroid post thyroidectomy.\par Feels well.\par TSH still elevated.\par Dose being increased of levothyroxine with repeat TFTs before end of July.

## 2020-07-08 ENCOUNTER — APPOINTMENT (OUTPATIENT)
Dept: OBGYN | Facility: CLINIC | Age: 40
End: 2020-07-08
Payer: COMMERCIAL

## 2020-07-08 VITALS
BODY MASS INDEX: 25.39 KG/M2 | SYSTOLIC BLOOD PRESSURE: 100 MMHG | WEIGHT: 158 LBS | DIASTOLIC BLOOD PRESSURE: 60 MMHG | HEART RATE: 67 BPM | OXYGEN SATURATION: 99 % | HEIGHT: 66 IN

## 2020-07-08 DIAGNOSIS — D25.9 LEIOMYOMA OF UTERUS, UNSPECIFIED: ICD-10-CM

## 2020-07-08 DIAGNOSIS — R10.2 PELVIC AND PERINEAL PAIN: ICD-10-CM

## 2020-07-08 PROCEDURE — 99214 OFFICE O/P EST MOD 30 MIN: CPT

## 2020-07-08 NOTE — REVIEW OF SYSTEMS
[Chills] : no chills [Dyspnea] : no shortness of breath [Feeling Tired] : not feeling tired [Chest Pain] : no chest pain [Constipation] : constipation [Diarrhea] : no diarrhea [Abdominal Pain] : abdominal pain [Incontinence] : no incontinence [Dysuria] : no dysuria [Pelvic Pain] : pelvic pain [Abn Vag Bleeding] : no abnormal vaginal bleeding [Excessive Bleeding During Period] : excessive bleeding during period [Painful Periods] : painful periods [Urgency] : no urgency [Breast Pain] : no breast pain [Joint Pain] : no joint pain [Arthralgias] : no arthralgias [Breast Lump] : no breast lump [Headache] : no headache [Nl] : Psychiatric

## 2020-07-08 NOTE — DISCUSSION/SUMMARY
[FreeTextEntry1] : Pelvic pain - overall decreasing\par Reviewed possible causes including fibroid - but this is fundally located based on ultrasound in Jan, urinary like a kidney stone given the severity of pain, or GI\par will get another ultrasound to assess\par \par Menorrhagia\par likely the Paragard is contributing\par will try lysteda for now\par \par Considering options - discussed [place] and hysterectomy\par done with childbearing \par \par Answered all questions\par \par Robyn Lundberg MD, PhD\par

## 2020-07-08 NOTE — PHYSICAL EXAM
[Awake] : awake [Alert] : alert [Soft] : soft [Acute Distress] : no acute distress [Tender] : tender [Distended] : not distended [H/Smegaly] : no hepatosplenomegaly [LLQ] : in the left lower quadrant [Firm] : not firm [Rigid] : not rigid [Rebound] : no rebound [Depressed Mood] : not depressed [Guarding] : no guarding [Oriented x3] : oriented to person, place, and time [Labia Majora Erythema] : no erythema of the labia majora [Flat Affect] : affect not flat [Labia Minora Erythema] : no erythema of the labia minora [No Lesions] : no genitalia lesions [Normal] : cervix [Labia Majora] : labia major [Labia Minora] : labia minora [Atrophy] : no atrophy [Polyp ___ cm] : had no polyp [Erythema] : no erythema [Discharge] : no discharge [Enlarged ___ wks] : enlarged [unfilled] ~Uweeks [Motion Tenderness] : there was no cervical motion tenderness [Mass ___ cm] : a [unfilled] ~Ucm uterine mass was palpated [Uterine Adnexae] : were not tender and not enlarged [RRR, No Murmurs] : RRR, no murmurs [Adnexa Tenderness] : were not tender [Ovarian Mass (___ Cm)] : there were no adnexal masses [CTAB] : CTAB [FreeTextEntry7] : tender on left - but did not seem to be ovary [FreeTextEntry9] : deferred

## 2020-10-23 ENCOUNTER — APPOINTMENT (OUTPATIENT)
Dept: ENDOCRINOLOGY | Facility: CLINIC | Age: 40
End: 2020-10-23
Payer: COMMERCIAL

## 2020-10-23 PROCEDURE — 99214 OFFICE O/P EST MOD 30 MIN: CPT | Mod: 95

## 2020-10-30 NOTE — HISTORY OF PRESENT ILLNESS
[Home] : at home, [unfilled] , at the time of the visit. [Medical Office: ([facility])___] : at the medical office located in  [Verbal consent obtained from patient] : the patient, [unfilled] [FreeTextEntry1] : Oct 23, 2020      Videochat  - \par \par Recent trip to [place], home of [place]   \par Ran out of LT4 a week ago.   \par Renew 137 and repeat labs end of January and ROV in January\par \par \par May 20, 2020    VideoChat - Facetime to \par \par PCP:  Dr. Nalini Noel\par \par CC:  Hypothyroid post thyroidectomy for Graves Disease.\par \par Feels well.\par TSH is elevated.\par Needs dose of levothyroxine increased.  \par \par Rx sent to [facility] at [address]\par She will ROV end of July after [facility] TFTs a few days before\par \par \par \par \par \par Apr 13, 2020\par \par This is a 21+ minute Tele-Phonic encounter with an established patient which was initiated by the patient during a time scheduled for a visit and the patient is aware that this may be a billable encounter.  The patient has not seen a provider of my specialty (Endocrinology) within out group in the past 7 days and this encounter is not anticipated to result in a scheduled in-person visit within he next 7 days.\par The reason for this Tele-Phonic encounter is listed below under "CC:"\par Verbal consent was discussed and obtained from the patient for this visit:  "You have chosen to receive care through the use of tele-media or telephone.   This enables health care providers at different locations to provide safe, effective, and convenient care through the use of technology.  Please note this is a billable encounter.  As with any health care service, there are risks associated with the use of tele-media or telephone, including equipment failure, poor image and/or resolution, and  issues.  You understand that I cannot physically examine you and that you may need to come to the office to complete the assessment.\par \par Patient agreed verbally to understanding the risks, benefits, alternatives of Tele-Media and telephone as explained.  All questions regarding tele-media and telephone encounters were answered.\par \par Apr 13, 2020\par \par .\par  CC: Hyperthyroid (Graves' disease with ophthalmopathy) \par  11/9/2018: thyroidectomy \par \par Taking levothyroxine 137 mcg daily from [facility] [address].\par Feels well.\par Last set of TFTs July 2019 in good range.\par \par Plan:  Updated labs at [facility] this week.\par Renew LT4\par ROV 8 months.  \par \par July 29, 2019\par \par PCP: Dr. Nalini Noel \par  Gyn: [facility] \par  Surgeon: Dr. Kyle Melchor \par  Eyes:  Dr. Berry Henderson - ref to glaucoma doctor as well as at [facility] for lid retraction OS and will go to [place] Nov 8, 2019\par .\par  CC: Hyperthyroid (Graves' disease with ophthalmopathy) \par  11/9/2018: thyroidectomy \par \par 38 yo working night shift at [facility] [employer].  \par Now taking levothyroxine 125 mcg daily.\par No double vision, blurry vision   \par \par Impression:  Thyroid surgery was November and by March TSH was 2.57 on levothyroxine\par 125 mcg daily.\par \par Plan:  TSI, TBII, TFTs today and ROV in six months.   \par \par \par \par \par \par March 29, 2019\par \par  PCP: Dr. Nalini Noel (to see)\par  Gyn: [facility] (to see)\par  Surgeon: Dr. Kyle Melchor \par .\par  CC: Hyperthyroid (Graves' disease with ophthalmopathy) \par  11/9/2018: thyroidectomy \par \par On levothyroxine 125 mcg daily post thyroidectomy in setting of hyperthyroidism due to Graves' Disease with ophthalmopathy. \par \par November 9, 2018:\par  "FINAL DIAGNOSIS\par  \par A. Delphian lymph node, excision:\par 	One benign reactive lymph node.\par \par B. Thyroid gland, total thyroidectomy:\par 	Thyroid tissue showing areas of mild hyperplastic change, mild fibrosis and few\par  minute hyperplastic-type nodules, consistent with treated diffuse hyperplasia\par  (Graves disease).	\par :"\par \par Impression: Graves' disease with ophthalmopathy post thyroidectomy.\par \par Plan: UPdated thyroid lab tests on April 1.\par See Dr. Berry Bah for her advice. \par ROV here in about 8 weeks.\par \par \par \par \par \par Previous notes from [employer] appended below.\par \par \par October 19, 2018\par .\par  PCP: Dr. Nalini Noel (to see)\par  Gyn: [facility] (to see)\par  Surgeon: Dr. Kyle Melchor \par .\par  CC: Hyperthyroid (Graves' disease with ophthalmopathy) \par .\par  37 yo on methimazole (started 7/19/2018) for hyperthyroidism due to Graves' disease. Because of her history of ophthalmopathy, she is not a good candidate for I-131 treatment and she is not likely to go into a prolonged remission from methimazole now that the condition has returned. Thus she met with Dr. Melchor for his opinion regarding thyroidectomy and she is scheduled for surgery on November 8. \par .\par  Currently taking methimazole 10 mg daily.\par .\par  Impression: TFTs in good range, although recent TSH still suppressed.\par .\par  Plan: Increase the methimazole to 10 mg x 2 tabs daily for now until surgery. TFTs a few days before surgery and call me for results. \par .\par  ==\par .\par  Sept 14, 2018\par .\par  PCP: Dr. Nalini Noel (to see)\par  Gyn: [facility] (to see)\par  Surgeon: Dr. Kyle Melchor (to see)\par .\par  Taking methimazole 10 mg daily. (started 7/19/2018)\par  Feels well.\par  Because of the eye symptoms from Graves' disease, she is not a good candidate for I-131 and she is not likely to go into a remission and she is interested in being considered for thyroid surgery.\par  She is scheduled to meet with Dr. Melchor later today. \par .\par  Labs on\par  8/18 included T3 135 which is down to 108 by 9/9\par  T4 on 8/18 was 11 and is now 9.2 by 9/9\par  TSH remains suppressed.\par .\par  Impression: Doing well on methimazole 10 mg daily and propranolol 5 mg BID\par .\par  Plan: TFTs before next visit here in one month. \par .\par  ==\par  /\par  August 17, 2018\par .\par  PCP: Dr. 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 Saw ophthalmologist in [facility] and went to endocrinologist in [facility]. Put on prednisone, antithyroid medication and "BP" med. After a few months, her R eye was still proptotic. \par  Sought medical advice in [place] at [facility]. \par .\[facility]  Saw Dr. Reji Mchugh at [facility]. Started on methimazole 10 mg daily and then tapered off and was able to stop about a year ago.\par  She underwent R eye surgery. by Dr. Brooks.. Dr. Brooks also lowered the eyelid - she did not want additional surgery. \par .\par  Now she notices some prominence of Left eye and Dr. Brooks gave her botox to lower L lid. \par .\par  [facility] [address]  and had TFTs about 3 weeks ago and was told that the overactivity had returned. TSH was 0.01, free T4 was 2.3 (0.8 - 1.8, total T3 162 (). \par .\par  She has been taking methiamzole 10 mg daily since 7/19/2018. [facility].\par .\par  Impression: She is aware that she is not a good candidate for I-131. She also knows that she is not likely to go into a prolonged remission from the hyperthyroidism from methimazole. Thus she says she is interested to obtain a surgical opinion.\par .\par  Plan: She will continue the methimazole 10 mg daily for now and I reviewed with her again potential side effects. She will have updated lab tests at [facility] tomorrow and she will arrange for an ultrasound of the thyroid at [facility]. She will call for the results of her studies in several days and return here after that. She will meet with Dr. Melchor on September 14 for his opinion on her management. \par  \par \par \par

## 2020-12-23 PROBLEM — Z01.419 ENCOUNTER FOR GYNECOLOGICAL EXAMINATION WITHOUT ABNORMAL FINDING: Status: RESOLVED | Noted: 2018-12-05 | Resolved: 2020-12-23

## 2021-03-04 ENCOUNTER — EMERGENCY (EMERGENCY)
Facility: HOSPITAL | Age: 41
LOS: 0 days | Discharge: HOME | End: 2021-03-04
Attending: EMERGENCY MEDICINE | Admitting: EMERGENCY MEDICINE
Payer: SELF-PAY

## 2021-03-04 VITALS
DIASTOLIC BLOOD PRESSURE: 77 MMHG | HEART RATE: 81 BPM | SYSTOLIC BLOOD PRESSURE: 124 MMHG | TEMPERATURE: 99 F | WEIGHT: 164.91 LBS | OXYGEN SATURATION: 100 % | RESPIRATION RATE: 17 BRPM

## 2021-03-04 DIAGNOSIS — Z88.8 ALLERGY STATUS TO OTHER DRUGS, MEDICAMENTS AND BIOLOGICAL SUBSTANCES: ICD-10-CM

## 2021-03-04 DIAGNOSIS — R53.1 WEAKNESS: ICD-10-CM

## 2021-03-04 DIAGNOSIS — Z90.49 ACQUIRED ABSENCE OF OTHER SPECIFIED PARTS OF DIGESTIVE TRACT: ICD-10-CM

## 2021-03-04 DIAGNOSIS — S09.90XA UNSPECIFIED INJURY OF HEAD, INITIAL ENCOUNTER: ICD-10-CM
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
## 2021-03-04 DIAGNOSIS — E89.0 POSTPROCEDURAL HYPOTHYROIDISM: Chronic | ICD-10-CM

## 2021-03-04 DIAGNOSIS — S49.90XA UNSPECIFIED INJURY OF SHOULDER AND UPPER ARM, UNSPECIFIED ARM, INITIAL ENCOUNTER: ICD-10-CM
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
## 2021-03-04 DIAGNOSIS — S43.401A UNSPECIFIED SPRAIN OF RIGHT SHOULDER JOINT, INITIAL ENCOUNTER: ICD-10-CM

## 2021-03-04 PROCEDURE — 99284 EMERGENCY DEPT VISIT MOD MDM: CPT

## 2021-03-04 PROCEDURE — 73000 X-RAY EXAM OF COLLAR BONE: CPT | Mod: 26,RT

## 2021-03-04 PROCEDURE — 73030 X-RAY EXAM OF SHOULDER: CPT | Mod: 26,RT

## 2021-03-04 RX ORDER — KETOROLAC TROMETHAMINE 30 MG/ML
15 SYRINGE (ML) INJECTION ONCE
Refills: 0 | Status: DISCONTINUED | OUTPATIENT
Start: 2021-03-04 | End: 2021-03-04

## 2021-03-04 RX ADMIN — Medication 15 MILLIGRAM(S): at 10:10

## 2021-03-04 NOTE — ED PROVIDER NOTE - ATTENDING CONTRIBUTION TO CARE
patient with right shoulder pain and mild head injury after fall onto right side from standing height no loc,  no vomiting, she has pain over right distal clavicle worse with movement distal ext exam is nml with nml pulse and nml pincer , right hand extension and flexion and nml elbow pain. plan is to obtain xray and pain control.

## 2021-03-04 NOTE — ED PROVIDER NOTE - PHYSICAL EXAMINATION
CONSTITUTIONAL: Well-developed; well-nourished; in no acute distress.   SKIN: warm, dry  HEAD: Normocephalic; atraumatic.  EYES: PERRL, EOMI, no conjunctival erythema  ENT: No nasal discharge; airway clear.  NECK: Supple; non tender.  CARD: S1, S2 normal; no murmurs, gallops, or rubs. Regular rate and rhythm.   RESP: No wheezes, rales or rhonchi.  ABD: soft ntnd  EXT: Decreased ROM in R shoulder, Point tenderness in the R distal clavicle and A/C joint.  No clubbing, cyanosis or edema.   LYMPH: No acute cervical adenopathy.  NEURO: Alert, oriented, grossly unremarkable  PSYCH: Cooperative, appropriate. CONSTITUTIONAL: Well-developed; well-nourished; in no acute distress.   SKIN: warm, dry  HEAD: Normocephalic; atraumatic.  EYES: PERRL, EOMI, no conjunctival erythema  ENT: No nasal discharge; airway clear.  NECK: Supple; non tender.  CARD: S1, S2 normal; no murmurs, gallops, or rubs. Regular rate and rhythm.   RESP: No wheezes, rales or rhonchi.  EXT: Decreased ROM in R shoulder, Point tenderness in the R distal clavicle and A/C joint.  nml neurovascular exam of distal ext with nml pincer , nml abduction of fingers, nml wrist flexion/extension. No clubbing, cyanosis or edema.   NEURO: Alert, oriented, grossly unremarkable

## 2021-03-04 NOTE — ED PROVIDER NOTE - CLINICAL SUMMARY MEDICAL DECISION MAKING FREE TEXT BOX
patient with shoulder pain with xray showing ac joint seperation, placed in sling already, fu with ortho

## 2021-03-04 NOTE — ED PROVIDER NOTE - NS ED ROS FT
Eyes:  No visual changes, eye pain or discharge.  ENMT:  No hearing changes, pain, no sore throat or runny nose, no difficulty swallowing  Cardiac:  No chest pain, SOB or edema. No chest pain with exertion.  Respiratory:  No cough or respiratory distress. No hemoptysis. No history of asthma or RAD.  GI:  No nausea, vomiting, diarrhea or abdominal pain.  :  No dysuria, frequency or burning.  MS:  +R. shoulder pain and weakness. No myalgia or back pain.  Neuro:   +headache No weakness.  No LOC.  Skin:  No skin rash.   Endocrine: No history of thyroid disease or diabetes. Eyes:  No visual changes, eye pain or discharge.  ENMT:  No hearing changes, pain, no sore throat or runny nose, no difficulty swallowing  Cardiac:  No chest pain, SOB or edema. No chest pain with exertion.  Respiratory:  No cough or respiratory distress. No hemoptysis. No history of asthma or RAD.  GI:  No nausea, vomiting, diarrhea or abdominal pain.  :  No dysuria, frequency or burning.  MS:  +R. shoulder pain and weakness. No myalgia or back pain.  Neuro:   +headache No weakness.  No LOC.  Skin:  No skin rash.   Endocrine: No  diabetes.

## 2021-03-04 NOTE — ED PROVIDER NOTE - OBJECTIVE STATEMENT
Pt is a 42 y/o F with PMHx of graves disease s/p thyroidectomy who presents to ED complaining of R. shoulder pain. Pt states that she was leaving the grocery store when she tripped and hit her R. shoulder and the R side of her head on a parked vehicle. The fall was witnessed by a store employee who helped her up off the ground. Pt denies LOC, and is not on anticoagulation. Pt states that she was unable to move her R. shoulder after the fall, she put it in a sling and immediately came to the ED. Pt has not tried anything to alleviate the pain which she rates 10/10. No position makes it better. Pt is able to wiggle her fingers and distal pulses are intact. Pt endorses headache and swelling in the shoulder. She denies numbness/tingling, ringing in the ears, fevers/chills, CP, SOB, N/V/D.    Patient endoreses a slight head ache

## 2021-03-04 NOTE — ED ADULT TRIAGE NOTE - CHIEF COMPLAINT QUOTE
"I tripped and fell. My right shoulder hurts so bad." (+) head injury. Denies LOC. Denies N/V. Not taking any anticoagulant.

## 2021-03-04 NOTE — ED PROVIDER NOTE - PATIENT PORTAL LINK FT
You can access the FollowMyHealth Patient Portal offered by [facility] by registering at the following website: [email or web address]. By joining [employer]’s FollowMyHealth portal, you will also be able to view your health information using other applications (apps) compatible with our system.

## 2021-03-04 NOTE — ED PROVIDER NOTE - CARE PROVIDER_API CALL
Lorraine Kaplan (MD)  Surgery of the Hand  [address] [place] [address]  Phone: [phone]  Fax: [phone]  Follow Up Time: 7-10 Days

## 2021-06-20 ENCOUNTER — EMERGENCY (EMERGENCY)
Facility: HOSPITAL | Age: 41
LOS: 0 days | Discharge: HOME | End: 2021-06-20
Attending: EMERGENCY MEDICINE | Admitting: EMERGENCY MEDICINE
Payer: SELF-PAY

## 2021-06-20 VITALS
DIASTOLIC BLOOD PRESSURE: 65 MMHG | HEART RATE: 69 BPM | TEMPERATURE: 98 F | WEIGHT: 164.91 LBS | OXYGEN SATURATION: 98 % | SYSTOLIC BLOOD PRESSURE: 114 MMHG | RESPIRATION RATE: 20 BRPM

## 2021-06-20 DIAGNOSIS — Z90.49 ACQUIRED ABSENCE OF OTHER SPECIFIED PARTS OF DIGESTIVE TRACT: ICD-10-CM

## 2021-06-20 DIAGNOSIS — R53.1 WEAKNESS: ICD-10-CM

## 2021-06-20 DIAGNOSIS — Z79.890 HORMONE REPLACEMENT THERAPY: ICD-10-CM

## 2021-06-20 DIAGNOSIS — R00.1 BRADYCARDIA, UNSPECIFIED: ICD-10-CM

## 2021-06-20 DIAGNOSIS — E05.90 THYROTOXICOSIS, UNSPECIFIED WITHOUT THYROTOXIC CRISIS OR STORM: ICD-10-CM

## 2021-06-20 DIAGNOSIS — R42 DIZZINESS AND GIDDINESS: ICD-10-CM

## 2021-06-20 DIAGNOSIS — E89.0 POSTPROCEDURAL HYPOTHYROIDISM: Chronic | ICD-10-CM

## 2021-06-20 DIAGNOSIS — Z88.8 ALLERGY STATUS TO OTHER DRUGS, MEDICAMENTS AND BIOLOGICAL SUBSTANCES: ICD-10-CM

## 2021-06-20 PROBLEM — E05.00 THYROTOXICOSIS WITH DIFFUSE GOITER WITHOUT THYROTOXIC CRISIS OR STORM: Chronic | Status: ACTIVE | Noted: 2021-03-04

## 2021-06-20 LAB
ALBUMIN SERPL ELPH-MCNC: 4.1 G/DL — SIGNIFICANT CHANGE UP (ref 3.5–5.2)
ALP SERPL-CCNC: 45 U/L — SIGNIFICANT CHANGE UP (ref 30–115)
ALT FLD-CCNC: 11 U/L — SIGNIFICANT CHANGE UP (ref 0–41)
ANION GAP SERPL CALC-SCNC: 10 MMOL/L — SIGNIFICANT CHANGE UP (ref 7–14)
AST SERPL-CCNC: 21 U/L — SIGNIFICANT CHANGE UP (ref 0–41)
BASOPHILS # BLD AUTO: 0.01 K/UL — SIGNIFICANT CHANGE UP (ref 0–0.2)
BASOPHILS NFR BLD AUTO: 0.1 % — SIGNIFICANT CHANGE UP (ref 0–1)
BILIRUB SERPL-MCNC: <0.2 MG/DL — SIGNIFICANT CHANGE UP (ref 0.2–1.2)
BUN SERPL-MCNC: 13 MG/DL — SIGNIFICANT CHANGE UP (ref 10–20)
CALCIUM SERPL-MCNC: 8.6 MG/DL — SIGNIFICANT CHANGE UP (ref 8.5–10.1)
CHLORIDE SERPL-SCNC: 104 MMOL/L — SIGNIFICANT CHANGE UP (ref 98–110)
CO2 SERPL-SCNC: 23 MMOL/L — SIGNIFICANT CHANGE UP (ref 17–32)
CREAT SERPL-MCNC: 0.8 MG/DL — SIGNIFICANT CHANGE UP (ref 0.7–1.5)
EOSINOPHIL # BLD AUTO: 0.04 K/UL — SIGNIFICANT CHANGE UP (ref 0–0.7)
EOSINOPHIL NFR BLD AUTO: 0.6 % — SIGNIFICANT CHANGE UP (ref 0–8)
GLUCOSE SERPL-MCNC: 131 MG/DL — HIGH (ref 70–99)
HCT VFR BLD CALC: 27.9 % — LOW (ref 37–47)
HGB BLD-MCNC: 9.3 G/DL — LOW (ref 12–16)
IMM GRANULOCYTES NFR BLD AUTO: 0.1 % — SIGNIFICANT CHANGE UP (ref 0.1–0.3)
LYMPHOCYTES # BLD AUTO: 2.15 K/UL — SIGNIFICANT CHANGE UP (ref 1.2–3.4)
LYMPHOCYTES # BLD AUTO: 31.6 % — SIGNIFICANT CHANGE UP (ref 20.5–51.1)
MCHC RBC-ENTMCNC: 28.9 PG — SIGNIFICANT CHANGE UP (ref 27–31)
MCHC RBC-ENTMCNC: 33.3 G/DL — SIGNIFICANT CHANGE UP (ref 32–37)
MCV RBC AUTO: 86.6 FL — SIGNIFICANT CHANGE UP (ref 81–99)
MONOCYTES # BLD AUTO: 0.55 K/UL — SIGNIFICANT CHANGE UP (ref 0.1–0.6)
MONOCYTES NFR BLD AUTO: 8.1 % — SIGNIFICANT CHANGE UP (ref 1.7–9.3)
NEUTROPHILS # BLD AUTO: 4.04 K/UL — SIGNIFICANT CHANGE UP (ref 1.4–6.5)
NEUTROPHILS NFR BLD AUTO: 59.5 % — SIGNIFICANT CHANGE UP (ref 42.2–75.2)
NRBC # BLD: 0 /100 WBCS — SIGNIFICANT CHANGE UP (ref 0–0)
PLATELET # BLD AUTO: 299 K/UL — SIGNIFICANT CHANGE UP (ref 130–400)
POTASSIUM SERPL-MCNC: 4.2 MMOL/L — SIGNIFICANT CHANGE UP (ref 3.5–5)
POTASSIUM SERPL-SCNC: 4.2 MMOL/L — SIGNIFICANT CHANGE UP (ref 3.5–5)
PROT SERPL-MCNC: 6.5 G/DL — SIGNIFICANT CHANGE UP (ref 6–8)
RBC # BLD: 3.22 M/UL — LOW (ref 4.2–5.4)
RBC # FLD: 13.1 % — SIGNIFICANT CHANGE UP (ref 11.5–14.5)
SODIUM SERPL-SCNC: 137 MMOL/L — SIGNIFICANT CHANGE UP (ref 135–146)
TROPONIN T SERPL-MCNC: <0.01 NG/ML — SIGNIFICANT CHANGE UP
WBC # BLD: 6.8 K/UL — SIGNIFICANT CHANGE UP (ref 4.8–10.8)
WBC # FLD AUTO: 6.8 K/UL — SIGNIFICANT CHANGE UP (ref 4.8–10.8)

## 2021-06-20 PROCEDURE — 93010 ELECTROCARDIOGRAM REPORT: CPT

## 2021-06-20 PROCEDURE — 99285 EMERGENCY DEPT VISIT HI MDM: CPT

## 2021-06-20 RX ORDER — MECLIZINE HCL 12.5 MG
25 TABLET ORAL ONCE
Refills: 0 | Status: COMPLETED | OUTPATIENT
Start: 2021-06-20 | End: 2021-06-20

## 2021-06-20 RX ORDER — SODIUM CHLORIDE 9 MG/ML
1000 INJECTION, SOLUTION INTRAVENOUS ONCE
Refills: 0 | Status: COMPLETED | OUTPATIENT
Start: 2021-06-20 | End: 2021-06-20

## 2021-06-20 RX ADMIN — SODIUM CHLORIDE 1000 MILLILITER(S): 9 INJECTION, SOLUTION INTRAVENOUS at 04:13

## 2021-06-20 RX ADMIN — Medication 25 MILLIGRAM(S): at 04:43

## 2021-06-20 NOTE — ED PROVIDER NOTE - NS ED ROS FT
Constitutional: (-) fever, (+) weakness   Eyes/ENT: (-) blurry vision, (-) epistaxis  Cardiovascular: (-) chest pain, (-) syncope  Respiratory: (-) cough, (-) shortness of breath  Gastrointestinal: (-) vomiting, (-) diarrhea  Musculoskeletal: (-) neck pain, (-) back pain, (-) joint pain  Integumentary: (-) rash, (-) edema  Neurological: (-) headache, (-) altered mental status  Psychiatric: (-) hallucinations  Allergic/Immunologic: (-) pruritus

## 2021-06-20 NOTE — ED PROVIDER NOTE - PATIENT PORTAL LINK FT
You can access the FollowMyHealth Patient Portal offered by [facility] by registering at the following website: [email or web address] By joining [facility]’s FollowMyHealth portal, you will also be able to view your health information using other applications (apps) compatible with our system.

## 2021-06-20 NOTE — ED PROVIDER NOTE - PROGRESS NOTE DETAILS
Discussed labs with patient. Hgb at baseline. Pt feels better, ambulated around the ED. Offered Obs/admission for further monitoring, pt electing to be discharged. Will return to the ED for any change or new symptoms.

## 2021-06-20 NOTE — ED PROVIDER NOTE - ATTENDING CONTRIBUTION TO CARE
Patient states that, after eating food, started feeling nauseous, which continued, followed by vomiting, after that, started feeling lightheaded, symptoms continued, so had decided to come to ED. Patient denies cp/sob/abd pain, denies palpitations, denies any neurologic symptoms. No trauma.   Vitals reviewed. Denies cardiovascular risk factors. Denies headache, denies URI symptoms.   RIC/EOMI, no nystagmus, supple neck,   Lungs: CTA, no crackles.   Heart: s1, s2, rrr, no M/G,   abd: +BS, NT, ND, soft, no rebound, no guarding.   CNS: awake, alert, o x 3, no focal neurologic deficits.   No cerebellar signs noted.   A/P: Lightheadedness,   nausea/vomiting.   labs, EKG, reevaluation.   Patient with h/o chronic anemia.

## 2021-06-20 NOTE — ED PROVIDER NOTE - OBJECTIVE STATEMENT
The pt is a 41y F w/ hx of thyrotoxicosis s/p thyroidectomy on Synthroid presenting with weakness, lightheadedness that started ~20:00. States she ate something and went to sleep, but woke up to go to the bathroom and still felt very weak, says she could barely make it to the bathroom. Has never felt this before. No recent change in Synthroid dosage. No headache, chest pain, SOB, emesis, abdominal pain, diarrhea, dysuria/hematuria. Menstrual cycles regular, LMP 2 weeks ago.

## 2021-06-20 NOTE — ED PROVIDER NOTE - CLINICAL SUMMARY MEDICAL DECISION MAKING FREE TEXT BOX
patient remained stable in ED, improved well. Discussed with patient about the need for admission to EDOU for further care of her health, patient verbalized understanding and preferred to go home. Patient stated that, she is feeling better, and preferred to go home and follow up with her doctors. Discussed with patient about the results of the diagnostic studies, patient verbalized understanding the information provided. Patient remained awake, alert, ambulatory and comfortable, tolerated PO. Discussed with patient in detail about the need for close outpatient follow up and the need to return to ED for any persistent, or worsening symptoms, for any new symptoms/concerns. patient verbalized understanding and agreed. patient is given detail aftercare instructions and is instructed well to f/u as outpatient for further care.

## 2021-12-03 NOTE — ED ADULT TRIAGE NOTE - BP NONINVASIVE DIASTOLIC (MM HG)
Body Location Override (Optional - Billing Will Still Be Based On Selected Body Map Location If Applicable): Left mid pretibial region Patient Name (Optional- Will Render 'the Patient' If Blank): Benton Mcguire Mohs Case Number:  Date Of Previous Biopsy (Optional): 10/15/21 Previous Accession (Optional): [id number] Biopsy Photograph Reviewed: Yes Consent Type: Consent 1 (Standard) Eye Shield Used: No Surgeon Performing Repair (Optional): Donis Day MD Initial Size Of Lesion: 1.1 X Size Of Lesion In Cm (Optional): 0 Number Of Stages: 1 Primary Defect Length In Cm (Final Defect Size - Required For Flaps/Grafts): 1.2 Primary Defect Width In Cm (Final Defect Size - Required For Flaps/Grafts): 1.3 Repair Type: Complex Repair Oculoplastic Surgeon Procedure Text (A): After obtaining clear surgical margins the patient was sent to oculoplastics for surgical repair.  The patient understands they will receive post-surgical care and follow-up from the referring physician's office. Otolaryngologist Procedure Text (A): After obtaining clear surgical margins the patient was sent to otolaryngology for surgical repair.  The patient understands they will receive post-surgical care and follow-up from the referring physician's office. 77 Plastic Surgeon Procedure Text (A): After obtaining clear surgical margins the patient was sent to plastics for surgical repair.  The patient understands they will receive post-surgical care and follow-up from the referring physician's office. Mid-Level Procedure Text (A): After obtaining clear surgical margins the patient was sent to a mid-level provider for surgical repair.  The patient understands they will receive post-surgical care and follow-up from the mid-level provider. Provider Procedure Text (A): After obtaining clear surgical margins the defect was repaired by another provider. Asc Procedure Text (A): After obtaining clear surgical margins the patient was sent to an ASC for surgical repair.  The patient understands they will receive post-surgical care and follow-up from the ASC physician. Simple / Intermediate / Complex Repair - Final Wound Length In Cm: 3.6 Suturegard Retention Suture: 2-0 Nylon Retention Suture Bite Size: 3 mm Length To Time In Minutes Device Was In Place: 10 Distance Of Undermining In Cm (Required): 1.5 Undermining Type: Entire Wound Debridement Text: The wound edges were debrided prior to proceeding with the closure to facilitate wound healing. Helical Rim Text: The closure involved the helical rim. Vermilion Border Text: The closure involved the vermilion border. Nostril Rim Text: The closure involved the nostril rim. Retention Suture Text: Retention sutures were placed to support the closure and prevent dehiscence. Location Indication Override (Is Already Calculated Based On Selected Body Location): Area M Area H Indication Text: Tumors in this location are included in Area H (eyelids, eyebrows, nose, lips, chin, ear, pre-auricular, post-auricular, temple, genitalia, hands, feet, ankles and areola).  Tissue conservation is critical in these anatomic locations. Area M Indication Text: Tumors in this location are included in Area M (cheek, forehead, scalp, neck, jawline and pretibial skin).  Mohs surgery is indicated for tumors in these anatomic locations. Area L Indication Text: Tumors in this location are included in Area L (trunk and extremities).  Mohs surgery is indicated for larger tumors, or tumors with aggressive histologic features, in these anatomic locations. Depth Of Tumor Invasion (For Histology): tumor not visualized (deep and peripheral margins are clear of tumor) Perineural Invasion (For Histology - Be Specific If Possible): absent Stage 1: Number Of Blocks?: 4 Special Stains Stage 1 - Results: Base On Clearance Noted Above Stage 2: Additional Anesthesia Type: 0.5% lidocaine with 1:200,000 epinephrine and a 1:10 solution of 8.4% sodium bicarbonate Include Tumor Staging In Mohs Note?: Please Select the Appropriate Response Staging Info: By selecting yes to the question above you will include information on AJCC 8 tumor staging in your Mohs note. Information on tumor staging will be automatically added for SCCs on the head and neck. AJCC 8 includes tumor size, tumor depth, perineural involvement and bone invasion. Tumor Depth: Less than 6mm from granular layer and no invasion beyond the subcutaneous fat Medical Necessity Statement: Based on my medical judgement, Mohs surgery is the most appropriate treatment for this cancer compared to other treatments. Alternatives Discussed Intro (Do Not Add Period): I discussed alternative treatments to Mohs surgery and specifically discussed the risks and benefits of Consent 1/Introductory Paragraph: The rationale for Mohs was explained to the patient and consent was obtained. The risks, benefits and alternatives to therapy were discussed in detail. Specifically, the risks of infection, scarring, bleeding, prolonged wound healing, incomplete removal, allergy to anesthesia, nerve injury and recurrence were addressed. Prior to the procedure, the treatment site was clearly identified and confirmed by the patient. All components of Universal Protocol/PAUSE Rule completed. Consent 2/Introductory Paragraph: Mohs surgery was explained to the patient and consent was obtained. The risks, benefits and alternatives to therapy were discussed in detail. Specifically, the risks of infection, scarring, bleeding, prolonged wound healing, incomplete removal, allergy to anesthesia, nerve injury and recurrence were addressed. Prior to the procedure, the treatment site was clearly identified and confirmed by the patient. All components of Universal Protocol/PAUSE Rule completed. Consent 3/Introductory Paragraph: I gave the patient a chance to ask questions they had about the procedure.  Following this I explained the Mohs procedure and consent was obtained. The risks, benefits and alternatives to therapy were discussed in detail. Specifically, the risks of infection, scarring, bleeding, prolonged wound healing, incomplete removal, allergy to anesthesia, nerve injury and recurrence were addressed. Prior to the procedure, the treatment site was clearly identified and confirmed by the patient. All components of Universal Protocol/PAUSE Rule completed. Consent (Temporal Branch)/Introductory Paragraph: The rationale for Mohs was explained to the patient and consent was obtained. The risks, benefits and alternatives to therapy were discussed in detail. Specifically, the risks of damage to the temporal branch of the facial nerve, infection, scarring, bleeding, prolonged wound healing, incomplete removal, allergy to anesthesia, and recurrence were addressed. Prior to the procedure, the treatment site was clearly identified and confirmed by the patient. All components of Universal Protocol/PAUSE Rule completed. Consent (Marginal Mandibular)/Introductory Paragraph: The rationale for Mohs was explained to the patient and consent was obtained. The risks, benefits and alternatives to therapy were discussed in detail. Specifically, the risks of damage to the marginal mandibular branch of the facial nerve, infection, scarring, bleeding, prolonged wound healing, incomplete removal, allergy to anesthesia, and recurrence were addressed. Prior to the procedure, the treatment site was clearly identified and confirmed by the patient. All components of Universal Protocol/PAUSE Rule completed. Consent (Spinal Accessory)/Introductory Paragraph: The rationale for Mohs was explained to the patient and consent was obtained. The risks, benefits and alternatives to therapy were discussed in detail. Specifically, the risks of damage to the spinal accessory nerve, infection, scarring, bleeding, prolonged wound healing, incomplete removal, allergy to anesthesia, and recurrence were addressed. Prior to the procedure, the treatment site was clearly identified and confirmed by the patient. All components of Universal Protocol/PAUSE Rule completed. Consent (Near Eyelid Margin)/Introductory Paragraph: The rationale for Mohs was explained to the patient and consent was obtained. The risks, benefits and alternatives to therapy were discussed in detail. Specifically, the risks of ectropion or eyelid deformity, infection, scarring, bleeding, prolonged wound healing, incomplete removal, allergy to anesthesia, nerve injury and recurrence were addressed. Prior to the procedure, the treatment site was clearly identified and confirmed by the patient. All components of Universal Protocol/PAUSE Rule completed. Consent (Ear)/Introductory Paragraph: The rationale for Mohs was explained to the patient and consent was obtained. The risks, benefits and alternatives to therapy were discussed in detail. Specifically, the risks of ear deformity, infection, scarring, bleeding, prolonged wound healing, incomplete removal, allergy to anesthesia, nerve injury and recurrence were addressed. Prior to the procedure, the treatment site was clearly identified and confirmed by the patient. All components of Universal Protocol/PAUSE Rule completed. Consent (Nose)/Introductory Paragraph: The rationale for Mohs was explained to the patient and consent was obtained. The risks, benefits and alternatives to therapy were discussed in detail. Specifically, the risks of nasal deformity, changes in the flow of air through the nose, infection, scarring, bleeding, prolonged wound healing, incomplete removal, allergy to anesthesia, nerve injury and recurrence were addressed. Prior to the procedure, the treatment site was clearly identified and confirmed by the patient. All components of Universal Protocol/PAUSE Rule completed. Consent (Lip)/Introductory Paragraph: The rationale for Mohs was explained to the patient and consent was obtained. The risks, benefits and alternatives to therapy were discussed in detail. Specifically, the risks of lip deformity, changes in the oral aperture, infection, scarring, bleeding, prolonged wound healing, incomplete removal, allergy to anesthesia, nerve injury and recurrence were addressed. Prior to the procedure, the treatment site was clearly identified and confirmed by the patient. All components of Universal Protocol/PAUSE Rule completed. Consent (Scalp)/Introductory Paragraph: The rationale for Mohs was explained to the patient and consent was obtained. The risks, benefits and alternatives to therapy were discussed in detail. Specifically, the risks of changes in hair growth pattern secondary to repair, infection, scarring, bleeding, prolonged wound healing, incomplete removal, allergy to anesthesia, nerve injury and recurrence were addressed. Prior to the procedure, the treatment site was clearly identified and confirmed by the patient. All components of Universal Protocol/PAUSE Rule completed. Detail Level: Detailed Additional Preop Diagnosis: squamous cell carcinoma (well-differentiated) Postop Diagnosis: same Anesthesia Volume In Cc: 6 Hemostasis: Electrocautery Estimated Blood Loss (Cc): minimal Stage 6: Additional Anesthesia Type: 1% lidocaine with epinephrine Repair Anesthesia Method: local infiltration Undermining Location (Optional): at the junction of the superficial and deep fat Brow Lift Text: A midfrontal incision was made medially to the defect to allow access to the tissues just superior to the left eyebrow. Following careful dissection inferiorly in a supraperiosteal plane to the level of the left eyebrow, several 3-0 monocryl sutures were used to resuspend the eyebrow orbicularis oculi muscular unit to the superior frontal bone periosteum. This resulted in an appropriate reapproximation of static eyebrow symmetry and correction of the left brow ptosis. Deep Sutures: 3-0 Polysorb Number Of Deep Sutures (Optional): 3 Epidermal Sutures: 3-0 Nylon Epidermal Closure: simple interrupted Suturegard Intro: Intraoperative tissue expansion was performed, utilizing the SUTUREGARD device, in order to reduce wound tension. Suturegard Body: The suture ends were repeatedly re-tightened and re-clamped to achieve the desired tissue expansion. Hemigard Intro: Due to skin fragility and wound tension, it was decided to use HEMIGARD adhesive retention suture devices to permit a linear closure. The skin was cleaned and dried for a 6cm distance away from the wound. Excessive hair, if present, was removed to allow for adhesion. Hemigard Postcare Instructions: The HEMIGARD strips are to remain completely dry for at least 5-7 days. Donor Site Anesthesia Type: same as repair anesthesia Graft Donor Site Bandage (Optional-Leave Blank If You Don't Want In Note): Steri-strips and a pressure bandage were applied to the donor site. Closure 2 Information: This tab is for additional flaps and grafts, including complex repair and grafts and complex repair and flaps. You can also specify a different location for the additional defect, if the location is the same you do not need to select a new one. We will insert the automated text for the repair you select below just as we do for solitary flaps and grafts. Please note that at this time if you select a location with a different insurance zone you will need to override the ICD10 and CPT if appropriate. Closure 3 Information: This tab is for additional flaps and grafts above and beyond our usual structured repairs.  Please note if you enter information here it will not currently bill and you will need to add the billing information manually. Wound Care: Bacitracin Dressing: pressure dressing with telfa Wound Care (No Sutures): Petrolatum Dressing (No Sutures): dry sterile dressing Suture Removal: 14 days Unna Boot Text: An Unna boot was placed to help immobilize the limb and facilitate more rapid healing. Home Suture Removal Text: Patient was provided instructions on removing sutures and will remove their sutures at home.  If they have any questions or difficulties they will call the office. Post-Care Instructions: I reviewed with the patient in detail post-care instructions. Patient is not to engage in any heavy lifting, exercise, or swimming for the next 14 days. Should the patient develop any fevers, chills, bleeding, severe pain patient will contact the office immediately. Pain Refusal Text: I offered to prescribe pain medication but the patient refused to take this medication. Mauc Instructions: By selecting yes to the question below the MAUC number will be added into the note.  This will be calculated automatically based on the diagnosis chosen, the size entered, the body zone selected (H,M,L) and the specific indications you chose. You will also have the option to override the Mohs AUC if you disagree with the automatically calculated number and this option is found in the Case Summary tab. Where Do You Want The Question To Include Opioid Counseling Located?: Case Summary Tab Eye Protection Verbiage: Before proceeding with the stage, a plastic scleral shield was inserted. The globe was anesthetized with a few drops of 1% lidocaine with 1:100,000 epinephrine. Then, an appropriate sized scleral shield was chosen and coated with lacrilube ointment. The shield was gently inserted and left in place for the duration of each stage. After the stage was completed, the shield was gently removed. Mohs Method Verbiage: An incision at a 45 degree angle following the standard Mohs approach was done and the specimen was harvested as a microscopic controlled layer. Surgeon/Pathologist Verbiage (Will Incorporate Name Of Surgeon From Intro If Not Blank): operated in two distinct and integrated capacities as the surgeon and pathologist. Mohs Histo Method Verbiage: The tissue was placed in petri dishes with epidermis superior to achieve precise orientation. Horizontal sectioning of the base and contiguous peripheral margins was then carried out and the prepared microscopic sections were examined by Dr. Day. Subsequent Stages Histo Method Verbiage: Using a similar technique to that described above, a thin layer of tissue was removed from all areas where tumor was visible on the previous stage.  The tissue was again oriented, mapped, dyed, and processed as above. Mohs Rapid Report Verbiage: The area of clinically evident tumor was marked with skin marking ink and appropriately hatched.  The initial incision was made following the Mohs approach through the skin.  The specimen was taken to the lab, divided into the necessary number of pieces, chromacoded and processed according to the Mohs protocol.  This was repeated in successive stages until a tumor free defect was achieved. Complex Repair Preamble Text (Leave Blank If You Do Not Want): Extensive wide undermining was performed. Intermediate Repair Preamble Text (Leave Blank If You Do Not Want): Undermining was performed with blunt dissection. Non-Graft Cartilage Fenestration Text: The cartilage was fenestrated with a 2mm punch biopsy to help facilitate healing. Graft Cartilage Fenestration Text: The cartilage was fenestrated with a 2mm punch biopsy to help facilitate graft survival and healing. Secondary Intention Text (Leave Blank If You Do Not Want): The defect will heal with secondary intention. No Repair - Repaired With Adjacent Surgical Defect Text (Leave Blank If You Do Not Want): After obtaining clear surgical margins the defect was repaired concurrently with another surgical defect which was in close approximation. Adjacent Tissue Transfer Text: The defect edges were debeveled with a #15 scalpel blade.  Given the location of the defect and the proximity to free margins an adjacent tissue transfer was deemed most appropriate.  Using a sterile surgical marker, an appropriate flap was drawn incorporating the defect and placing the expected incisions within the relaxed skin tension lines where possible.    The area thus outlined was incised deep to adipose tissue with a #15 scalpel blade.  The skin margins were undermined to an appropriate distance in all directions utilizing iris scissors. Advancement Flap (Single) Text: The defect edges were debeveled with a #15 scalpel blade.  Given the location of the defect and the proximity to free margins a single advancement flap was deemed most appropriate.  Using a sterile surgical marker, an appropriate advancement flap was drawn incorporating the defect and placing the expected incisions within the relaxed skin tension lines where possible.    The area thus outlined was incised deep to adipose tissue with a #15 scalpel blade.  The skin margins were undermined to an appropriate distance in all directions utilizing iris scissors. Advancement Flap (Double) Text: The defect edges were debeveled with a #15 scalpel blade.  Given the location of the defect and the proximity to free margins a double advancement flap was deemed most appropriate.  Using a sterile surgical marker, the appropriate advancement flaps were drawn incorporating the defect and placing the expected incisions within the relaxed skin tension lines where possible.    The area thus outlined was incised deep to adipose tissue with a #15 scalpel blade.  The skin margins were undermined to an appropriate distance in all directions utilizing iris scissors. Burow's Advancement Flap Text: The defect edges were debeveled with a #15 scalpel blade.  Given the location of the defect and the proximity to free margins a Burow's advancement flap was deemed most appropriate.  Using a sterile surgical marker, the appropriate advancement flap was drawn incorporating the defect and placing the expected incisions within the relaxed skin tension lines where possible.    The area thus outlined was incised deep to adipose tissue with a #15 scalpel blade.  The skin margins were undermined to an appropriate distance in all directions utilizing iris scissors. Chonodrocutaneous Helical Advancement Flap Text: The defect edges were debeveled with a #15 scalpel blade.  Given the location of the defect and the proximity to free margins a chondrocutaneous helical advancement flap was deemed most appropriate.  Using a sterile surgical marker, the appropriate advancement flap was drawn incorporating the defect and placing the expected incisions within the relaxed skin tension lines where possible.    The area thus outlined was incised deep to adipose tissue with a #15 scalpel blade.  The skin margins were undermined to an appropriate distance in all directions utilizing iris scissors. Crescentic Advancement Flap Text: The defect edges were debeveled with a #15 scalpel blade.  Given the location of the defect and the proximity to free margins a crescentic advancement flap was deemed most appropriate.  Using a sterile surgical marker, the appropriate advancement flap was drawn incorporating the defect and placing the expected incisions within the relaxed skin tension lines where possible.    The area thus outlined was incised deep to adipose tissue with a #15 scalpel blade.  The skin margins were undermined to an appropriate distance in all directions utilizing iris scissors. A-T Advancement Flap Text: The defect edges were debeveled with a #15 scalpel blade.  Given the location of the defect, shape of the defect and the proximity to free margins an A-T advancement flap was deemed most appropriate.  Using a sterile surgical marker, an appropriate advancement flap was drawn incorporating the defect and placing the expected incisions within the relaxed skin tension lines where possible.    The area thus outlined was incised deep to adipose tissue with a #15 scalpel blade.  The skin margins were undermined to an appropriate distance in all directions utilizing iris scissors. O-T Advancement Flap Text: The defect edges were debeveled with a #10 scalpel blade.  Given the location of the defect, shape of the defect and the proximity to free margins an O-T advancement flap was deemed most appropriate.  Using a sterile surgical marker, an appropriate advancement flap was drawn incorporating the defect and placing the expected incisions within the relaxed skin tension lines where possible.    The area thus outlined was incised deep to adipose tissue with a #10 scalpel blade.  The skin margins were undermined to an appropriate distance in all directions utilizing iris scissors. O-L Flap Text: The defect edges were debeveled with a #15 scalpel blade.  Given the location of the defect, shape of the defect and the proximity to free margins an O-L flap was deemed most appropriate.  Using a sterile surgical marker, an appropriate advancement flap was drawn incorporating the defect and placing the expected incisions within the relaxed skin tension lines where possible.    The area thus outlined was incised deep to adipose tissue with a #15 scalpel blade.  The skin margins were undermined to an appropriate distance in all directions utilizing iris scissors. O-Z Flap Text: The defect edges were debeveled with a #15 scalpel blade.  Given the location of the defect, shape of the defect and the proximity to free margins an O-Z flap was deemed most appropriate.  Using a sterile surgical marker, an appropriate transposition flap was drawn incorporating the defect and placing the expected incisions within the relaxed skin tension lines where possible. The area thus outlined was incised deep to adipose tissue with a #15 scalpel blade.  The skin margins were undermined to an appropriate distance in all directions utilizing iris scissors. Double O-Z Flap Text: The defect edges were debeveled with a #15 scalpel blade.  Given the location of the defect, shape of the defect and the proximity to free margins a Double O-Z flap was deemed most appropriate.  Using a sterile surgical marker, an appropriate transposition flap was drawn incorporating the defect and placing the expected incisions within the relaxed skin tension lines where possible. The area thus outlined was incised deep to adipose tissue with a #15 scalpel blade.  The skin margins were undermined to an appropriate distance in all directions utilizing iris scissors. V-Y Flap Text: The defect edges were debeveled with a #15 scalpel blade.  Given the location of the defect, shape of the defect and the proximity to free margins a V-Y flap was deemed most appropriate.  Using a sterile surgical marker, an appropriate advancement flap was drawn incorporating the defect and placing the expected incisions within the relaxed skin tension lines where possible.    The area thus outlined was incised deep to adipose tissue with a #15 scalpel blade.  The skin margins were undermined to an appropriate distance in all directions utilizing iris scissors. Advancement-Rotation Flap Text: The defect edges were debeveled with a #15 scalpel blade.  Given the location of the defect, shape of the defect and the proximity to free margins an advancement-rotation flap was deemed most appropriate.  Using a sterile surgical marker, an appropriate flap was drawn incorporating the defect and placing the expected incisions within the relaxed skin tension lines where possible. The area thus outlined was incised deep to adipose tissue with a #15 scalpel blade.  The skin margins were undermined to an appropriate distance in all directions utilizing iris scissors. Mercedes Flap Text: The defect edges were debeveled with a #15 scalpel blade.  Given the location of the defect, shape of the defect and the proximity to free margins a Mercedes flap was deemed most appropriate.  Using a sterile surgical marker, an appropriate advancement flap was drawn incorporating the defect and placing the expected incisions within the relaxed skin tension lines where possible. The area thus outlined was incised deep to adipose tissue with a #15 scalpel blade.  The skin margins were undermined to an appropriate distance in all directions utilizing iris scissors. Modified Advancement Flap Text: The defect edges were debeveled with a #15 scalpel blade.  Given the location of the defect, shape of the defect and the proximity to free margins a modified advancement flap was deemed most appropriate.  Using a sterile surgical marker, an appropriate advancement flap was drawn incorporating the defect and placing the expected incisions within the relaxed skin tension lines where possible.    The area thus outlined was incised deep to adipose tissue with a #15 scalpel blade.  The skin margins were undermined to an appropriate distance in all directions utilizing iris scissors. Mucosal Advancement Flap Text: Given the location of the defect, shape of the defect and the proximity to free margins a mucosal advancement flap was deemed most appropriate. Incisions were made with a 15 blade scalpel in the appropriate fashion along the cutaneous vermillion border and the mucosal lip. The remaining actinically damaged mucosal tissue was excised.  The mucosal advancement flap was then elevated to the gingival sulcus with care taken to preserve the neurovascular structures and advanced into the primary defect. Care was taken to ensure that precise realignment of the vermillion border was achieved. Peng Advancement Flap Text: The defect edges were debeveled with a #15 scalpel blade.  Given the location of the defect, shape of the defect and the proximity to free margins a Peng advancement flap was deemed most appropriate.  Using a sterile surgical marker, an appropriate advancement flap was drawn incorporating the defect and placing the expected incisions within the relaxed skin tension lines where possible. The area thus outlined was incised deep to adipose tissue with a #15 scalpel blade.  The skin margins were undermined to an appropriate distance in all directions utilizing iris scissors. Hatchet Flap Text: The defect edges were debeveled with a #15 scalpel blade.  Given the location of the defect, shape of the defect and the proximity to free margins a hatchet flap was deemed most appropriate.  Using a sterile surgical marker, an appropriate hatchet flap was drawn incorporating the defect and placing the expected incisions within the relaxed skin tension lines where possible.    The area thus outlined was incised deep to adipose tissue with a #15 scalpel blade.  The skin margins were undermined to an appropriate distance in all directions utilizing iris scissors. Rotation Flap Text: The defect edges were debeveled with a #15 scalpel blade.  Given the location of the defect, shape of the defect and the proximity to free margins a rotation flap was deemed most appropriate.  Using a sterile surgical marker, an appropriate rotation flap was drawn incorporating the defect and placing the expected incisions within the relaxed skin tension lines where possible.    The area thus outlined was incised deep to adipose tissue with a #15 scalpel blade.  The skin margins were undermined to an appropriate distance in all directions utilizing iris scissors. Spiral Flap Text: The defect edges were debeveled with a #15 scalpel blade.  Given the location of the defect, shape of the defect and the proximity to free margins a spiral flap was deemed most appropriate.  Using a sterile surgical marker, an appropriate rotation flap was drawn incorporating the defect and placing the expected incisions within the relaxed skin tension lines where possible. The area thus outlined was incised deep to adipose tissue with a #15 scalpel blade.  The skin margins were undermined to an appropriate distance in all directions utilizing iris scissors. Staged Advancement Flap Text: The defect edges were debeveled with a #15 scalpel blade.  Given the location of the defect, shape of the defect and the proximity to free margins a staged advancement flap was deemed most appropriate.  Using a sterile surgical marker, an appropriate advancement flap was drawn incorporating the defect and placing the expected incisions within the relaxed skin tension lines where possible. The area thus outlined was incised deep to adipose tissue with a #15 scalpel blade.  The skin margins were undermined to an appropriate distance in all directions utilizing iris scissors. Star Wedge Flap Text: The defect edges were debeveled with a #15 scalpel blade.  Given the location of the defect, shape of the defect and the proximity to free margins a star wedge flap was deemed most appropriate.  Using a sterile surgical marker, an appropriate rotation flap was drawn incorporating the defect and placing the expected incisions within the relaxed skin tension lines where possible. The area thus outlined was incised deep to adipose tissue with a #15 scalpel blade.  The skin margins were undermined to an appropriate distance in all directions utilizing iris scissors. Transposition Flap Text: The defect edges were debeveled with a #15 scalpel blade.  Given the location of the defect and the proximity to free margins a superior based transposition flap was deemed most appropriate.  Using a sterile surgical marker, an appropriate superior based transposition flap was drawn incorporating the defect.    The area thus outlined was incised deep to adipose tissue with a #15 scalpel blade.  The skin margins were undermined to an appropriate distance in all directions utilizing iris scissors. Muscle Hinge Flap Text: The defect edges were debeveled with a #15 scalpel blade.  Given the size, depth and location of the defect and the proximity to free margins a muscle hinge flap was deemed most appropriate.  Using a sterile surgical marker, an appropriate hinge flap was drawn incorporating the defect. The area thus outlined was incised with a #15 scalpel blade.  The skin margins were undermined to an appropriate distance in all directions utilizing iris scissors. Mustarde Flap Text: The defect edges were debeveled with a #15 scalpel blade.  Given the size, depth and location of the defect and the proximity to free margins a Mustarde flap was deemed most appropriate.  Using a sterile surgical marker, an appropriate flap was drawn incorporating the defect. The area thus outlined was incised with a #15 scalpel blade.  The skin margins were undermined to an appropriate distance in all directions utilizing iris scissors. Nasal Turnover Hinge Flap Text: The defect edges were debeveled with a #15 scalpel blade.  Given the size, depth, location of the defect and the defect being full thickness a nasal turnover hinge flap was deemed most appropriate.  Using a sterile surgical marker, an appropriate hinge flap was drawn incorporating the defect. The area thus outlined was incised with a #15 scalpel blade. The flap was designed to recreate the nasal mucosal lining and the alar rim. The skin margins were undermined to an appropriate distance in all directions utilizing iris scissors. Nasalis-Muscle-Based Myocutaneous Island Pedicle Flap Text: Using a #15 blade, an incision was made around the donor flap to the level of the nasalis muscle. Wide lateral undermining was then performed in both the subcutaneous plane above the nasalis muscle, and in a submuscular plane just above periosteum. This allowed the formation of a free nasalis muscle axial pedicle (based on the angular artery) which was still attached to the actual cutaneous flap, increasing its mobility and vascular viability. Hemostasis was obtained with pinpoint electrocoagulation. The flap was mobilized into position and the pivotal anchor points positioned and stabilized with buried interrupted sutures. Subcutaneous and dermal tissues were closed in a multilayered fashion with sutures. Tissue redundancies were excised, and the epidermal edges were apposed without significant tension and sutured with sutures. Orbicularis Oris Muscle Flap Text: The defect edges were debeveled with a #15 scalpel blade.  Given that the defect affected the competency of the oral sphincter an obicularis oris muscle flap was deemed most appropriate to restore this competency and normal muscle function.  Using a sterile surgical marker, an appropriate flap was drawn incorporating the defect. The area thus outlined was incised with a #15 scalpel blade. Melolabial Transposition Flap Text: The defect edges were debeveled with a #15 scalpel blade.  Given the location of the defect and the proximity to free margins a melolabial flap was deemed most appropriate.  Using a sterile surgical marker, an appropriate melolabial transposition flap was drawn incorporating the defect.    The area thus outlined was incised deep to adipose tissue with a #15 scalpel blade.  The skin margins were undermined to an appropriate distance in all directions utilizing iris scissors. Rhombic Flap Text: The defect edges were debeveled with a #15 scalpel blade.  Given the location of the defect and the proximity to free margins a rhombic flap was deemed most appropriate.  Using a sterile surgical marker, an appropriate rhombic flap was drawn incorporating the defect.    The area thus outlined was incised deep to adipose tissue with a #15 scalpel blade.  The skin margins were undermined to an appropriate distance in all directions utilizing iris scissors. Rhomboid Transposition Flap Text: The defect edges were debeveled with a #15 scalpel blade.  Given the location of the defect and the proximity to free margins a rhomboid transposition flap was deemed most appropriate.  Using a sterile surgical marker, an appropriate rhomboid flap was drawn incorporating the defect.    The area thus outlined was incised deep to adipose tissue with a #15 scalpel blade.  The skin margins were undermined to an appropriate distance in all directions utilizing iris scissors. Bi-Rhombic Flap Text: The defect edges were debeveled with a #15 scalpel blade.  Given the location of the defect and the proximity to free margins a bi-rhombic flap was deemed most appropriate.  Using a sterile surgical marker, an appropriate rhombic flap was drawn incorporating the defect. The area thus outlined was incised deep to adipose tissue with a #15 scalpel blade.  The skin margins were undermined to an appropriate distance in all directions utilizing iris scissors. Helical Rim Advancement Flap Text: The defect edges were debeveled with a #15 blade scalpel.  Given the location of the defect and the proximity to free margins (helical rim) a double helical rim advancement flap was deemed most appropriate.  Using a sterile surgical marker, the appropriate advancement flaps were drawn incorporating the defect and placing the expected incisions between the helical rim and antihelix where possible.  The area thus outlined was incised through and through with a #15 scalpel blade.  With a skin hook and iris scissors, the flaps were gently and sharply undermined and freed up. Bilateral Helical Rim Advancement Flap Text: The defect edges were debeveled with a #15 blade scalpel.  Given the location of the defect and the proximity to free margins (helical rim) a bilateral helical rim advancement flap was deemed most appropriate.  Using a sterile surgical marker, the appropriate advancement flaps were drawn incorporating the defect and placing the expected incisions between the helical rim and antihelix where possible.  The area thus outlined was incised through and through with a #15 scalpel blade.  With a skin hook and iris scissors, the flaps were gently and sharply undermined and freed up. Ear Star Wedge Flap Text: The defect edges were debeveled with a #15 blade scalpel.  Given the location of the defect and the proximity to free margins (helical rim) an ear star wedge flap was deemed most appropriate.  Using a sterile surgical marker, the appropriate flap was drawn incorporating the defect and placing the expected incisions between the helical rim and antihelix where possible.  The area thus outlined was incised through and through with a #15 scalpel blade. Banner Transposition Flap Text: The defect edges were debeveled with a #15 scalpel blade.  Given the location of the defect and the proximity to free margins a Banner transposition flap was deemed most appropriate.  Using a sterile surgical marker, an appropriate flap drawn around the defect. The area thus outlined was incised deep to adipose tissue with a #15 scalpel blade.  The skin margins were undermined to an appropriate distance in all directions utilizing iris scissors. Bilobed Flap Text: The defect edges were debeveled with a #15 scalpel blade.  Given the location of the defect and the proximity to free margins a bilobe flap was deemed most appropriate.  Using a sterile surgical marker, an appropriate bilobe flap drawn around the defect.    The area thus outlined was incised deep to adipose tissue with a #15 scalpel blade.  The skin margins were undermined to an appropriate distance in all directions utilizing iris scissors. Bilobed Transposition Flap Text: The defect edges were debeveled with a #15 scalpel blade.  Given the location of the defect and the proximity to free margins a bilobed transposition flap was deemed most appropriate.  Using a sterile surgical marker, an appropriate bilobe flap drawn around the defect.    The area thus outlined was incised deep to adipose tissue with a #15 scalpel blade.  The skin margins were undermined to an appropriate distance in all directions utilizing iris scissors. Trilobed Flap Text: The defect edges were debeveled with a #15 scalpel blade.  Given the location of the defect and the proximity to free margins a trilobed flap was deemed most appropriate.  Using a sterile surgical marker, an appropriate trilobed flap drawn around the defect.    The area thus outlined was incised deep to adipose tissue with a #15 scalpel blade.  The skin margins were undermined to an appropriate distance in all directions utilizing iris scissors. Dorsal Nasal Flap Text: The defect edges were debeveled with a #15 scalpel blade.  Given the location of the defect and the proximity to free margins a dorsal nasal flap was deemed most appropriate.  Using a sterile surgical marker, an appropriate dorsal nasal flap was drawn around the defect.    The area thus outlined was incised deep to adipose tissue with a #15 scalpel blade.  The skin margins were undermined to an appropriate distance in all directions utilizing iris scissors. Island Pedicle Flap Text: The defect edges were debeveled with a #15 scalpel blade.  Given the location of the defect, shape of the defect and the proximity to free margins an island pedicle advancement flap was deemed most appropriate.  Using a sterile surgical marker, an appropriate advancement flap was drawn incorporating the defect, outlining the appropriate donor tissue and placing the expected incisions within the relaxed skin tension lines where possible.    The area thus outlined was incised deep to adipose tissue with a #15 scalpel blade.  The skin margins were undermined to an appropriate distance in all directions around the primary defect and laterally outward around the island pedicle utilizing iris scissors.  There was minimal undermining beneath the pedicle flap. Island Pedicle Flap With Canthal Suspension Text: The defect edges were debeveled with a #15 scalpel blade.  Given the location of the defect, shape of the defect and the proximity to free margins an island pedicle advancement flap was deemed most appropriate.  Using a sterile surgical marker, an appropriate advancement flap was drawn incorporating the defect, outlining the appropriate donor tissue and placing the expected incisions within the relaxed skin tension lines where possible. The area thus outlined was incised deep to adipose tissue with a #15 scalpel blade.  The skin margins were undermined to an appropriate distance in all directions around the primary defect and laterally outward around the island pedicle utilizing iris scissors.  There was minimal undermining beneath the pedicle flap. A suspension suture was placed in the canthal tendon to prevent tension and prevent ectropion. Alar Island Pedicle Flap Text: The defect edges were debeveled with a #15 scalpel blade.  Given the location of the defect, shape of the defect and the proximity to the alar rim an island pedicle advancement flap was deemed most appropriate.  Using a sterile surgical marker, an appropriate advancement flap was drawn incorporating the defect, outlining the appropriate donor tissue and placing the expected incisions within the nasal ala running parallel to the alar rim. The area thus outlined was incised with a #15 scalpel blade.  The skin margins were undermined minimally to an appropriate distance in all directions around the primary defect and laterally outward around the island pedicle utilizing iris scissors.  There was minimal undermining beneath the pedicle flap. Double Island Pedicle Flap Text: The defect edges were debeveled with a #15 scalpel blade.  Given the location of the defect, shape of the defect and the proximity to free margins a double island pedicle advancement flap was deemed most appropriate.  Using a sterile surgical marker, an appropriate advancement flap was drawn incorporating the defect, outlining the appropriate donor tissue and placing the expected incisions within the relaxed skin tension lines where possible.    The area thus outlined was incised deep to adipose tissue with a #15 scalpel blade.  The skin margins were undermined to an appropriate distance in all directions around the primary defect and laterally outward around the island pedicle utilizing iris scissors.  There was minimal undermining beneath the pedicle flap. Island Pedicle Flap-Requiring Vessel Identification Text: The defect edges were debeveled with a #15 scalpel blade.  Given the location of the defect, shape of the defect and the proximity to free margins an island pedicle advancement flap was deemed most appropriate.  Using a sterile surgical marker, an appropriate advancement flap was drawn, based on the axial vessel mentioned above, incorporating the defect, outlining the appropriate donor tissue and placing the expected incisions within the relaxed skin tension lines where possible.    The area thus outlined was incised deep to adipose tissue with a #15 scalpel blade.  The skin margins were undermined to an appropriate distance in all directions around the primary defect and laterally outward around the island pedicle utilizing iris scissors.  There was minimal undermining beneath the pedicle flap. Keystone Flap Text: The defect edges were debeveled with a #15 scalpel blade.  Given the location of the defect, shape of the defect a keystone flap was deemed most appropriate.  Using a sterile surgical marker, an appropriate keystone flap was drawn incorporating the defect, outlining the appropriate donor tissue and placing the expected incisions within the relaxed skin tension lines where possible. The area thus outlined was incised deep to adipose tissue with a #15 scalpel blade.  The skin margins were undermined to an appropriate distance in all directions around the primary defect and laterally outward around the flap utilizing iris scissors. O-T Plasty Text: The defect edges were debeveled with a #15 scalpel blade.  Given the location of the defect, shape of the defect and the proximity to free margins an O-T plasty was deemed most appropriate.  Using a sterile surgical marker, an appropriate O-T plasty was drawn incorporating the defect and placing the expected incisions within the relaxed skin tension lines where possible.    The area thus outlined was incised deep to adipose tissue with a #15 scalpel blade.  The skin margins were undermined to an appropriate distance in all directions utilizing iris scissors. O-Z Plasty Text: The defect edges were debeveled with a #15 scalpel blade.  Given the location of the defect, shape of the defect and the proximity to free margins an O-Z plasty (double transposition flap) was deemed most appropriate.  Using a sterile surgical marker, the appropriate transposition flaps were drawn incorporating the defect and placing the expected incisions within the relaxed skin tension lines where possible.    The area thus outlined was incised deep to adipose tissue with a #15 scalpel blade.  The skin margins were undermined to an appropriate distance in all directions utilizing iris scissors.  Hemostasis was achieved with electrocautery.  The flaps were then transposed into place, one clockwise and the other counterclockwise, and anchored with interrupted buried subcutaneous sutures. Double O-Z Plasty Text: The defect edges were debeveled with a #15 scalpel blade.  Given the location of the defect, shape of the defect and the proximity to free margins a Double O-Z plasty (double transposition flap) was deemed most appropriate.  Using a sterile surgical marker, the appropriate transposition flaps were drawn incorporating the defect and placing the expected incisions within the relaxed skin tension lines where possible. The area thus outlined was incised deep to adipose tissue with a #15 scalpel blade.  The skin margins were undermined to an appropriate distance in all directions utilizing iris scissors.  Hemostasis was achieved with electrocautery.  The flaps were then transposed into place, one clockwise and the other counterclockwise, and anchored with interrupted buried subcutaneous sutures. V-Y Plasty Text: The defect edges were debeveled with a #15 scalpel blade.  Given the location of the defect, shape of the defect and the proximity to free margins an V-Y advancement flap was deemed most appropriate.  Using a sterile surgical marker, an appropriate advancement flap was drawn incorporating the defect and placing the expected incisions within the relaxed skin tension lines where possible.    The area thus outlined was incised deep to adipose tissue with a #15 scalpel blade.  The skin margins were undermined to an appropriate distance in all directions utilizing iris scissors. H Plasty Text: Given the location of the defect, shape of the defect and the proximity to free margins a H-plasty was deemed most appropriate for repair.  Using a sterile surgical marker, the appropriate advancement arms of the H-plasty were drawn incorporating the defect and placing the expected incisions within the relaxed skin tension lines where possible. The area thus outlined was incised deep to adipose tissue with a #15 scalpel blade. The skin margins were undermined to an appropriate distance in all directions utilizing iris scissors.  The opposing advancement arms were then advanced into place in opposite direction and anchored with interrupted buried subcutaneous sutures. W Plasty Text: The lesion was extirpated to the level of the fat with a #15 scalpel blade.  Given the location of the defect, shape of the defect and the proximity to free margins a W-plasty was deemed most appropriate for repair.  Using a sterile surgical marker, the appropriate transposition arms of the W-plasty were drawn incorporating the defect and placing the expected incisions within the relaxed skin tension lines where possible.    The area thus outlined was incised deep to adipose tissue with a #15 scalpel blade.  The skin margins were undermined to an appropriate distance in all directions utilizing iris scissors.  The opposing transposition arms were then transposed into place in opposite direction and anchored with interrupted buried subcutaneous sutures. Z Plasty Text: The lesion was extirpated to the level of the fat with a #15 scalpel blade.  Given the location of the defect, shape of the defect and the proximity to free margins a Z-plasty was deemed most appropriate for repair.  Using a sterile surgical marker, the appropriate transposition arms of the Z-plasty were drawn incorporating the defect and placing the expected incisions within the relaxed skin tension lines where possible.    The area thus outlined was incised deep to adipose tissue with a #15 scalpel blade.  The skin margins were undermined to an appropriate distance in all directions utilizing iris scissors.  The opposing transposition arms were then transposed into place in opposite direction and anchored with interrupted buried subcutaneous sutures. Zygomaticofacial Flap Text: Given the location of the defect, shape of the defect and the proximity to free margins a zygomaticofacial flap was deemed most appropriate for repair.  Using a sterile surgical marker, the appropriate flap was drawn incorporating the defect and placing the expected incisions within the relaxed skin tension lines where possible. The area thus outlined was incised deep to adipose tissue with a #15 scalpel blade with preservation of a vascular pedicle.  The skin margins were undermined to an appropriate distance in all directions utilizing iris scissors.  The flap was then placed into the defect and anchored with interrupted buried subcutaneous sutures. Cheek Interpolation Flap Text: A decision was made to reconstruct the defect utilizing an interpolation axial flap and a staged reconstruction.  A telfa template was made of the defect.  This telfa template was then used to outline the Cheek Interpolation flap.  The donor area for the pedicle flap was then injected with anesthesia.  The flap was excised through the skin and subcutaneous tissue down to the layer of the underlying musculature.  The interpolation flap was carefully excised within this deep plane to maintain its blood supply.  The edges of the donor site were undermined.   The donor site was closed in a primary fashion.  The pedicle was then rotated into position and sutured.  Once the tube was sutured into place, adequate blood supply was confirmed with blanching and refill.  The pedicle was then wrapped with xeroform gauze and dressed appropriately with a telfa and gauze bandage to ensure continued blood supply and protect the attached pedicle. Cheek-To-Nose Interpolation Flap Text: A decision was made to reconstruct the defect utilizing an interpolation axial flap and a staged reconstruction.  A telfa template was made of the defect.  This telfa template was then used to outline the Cheek-To-Nose Interpolation flap.  The donor area for the pedicle flap was then injected with anesthesia.  The flap was excised through the skin and subcutaneous tissue down to the layer of the underlying musculature.  The interpolation flap was carefully excised within this deep plane to maintain its blood supply.  The edges of the donor site were undermined.   The donor site was closed in a primary fashion.  The pedicle was then rotated into position and sutured.  Once the tube was sutured into place, adequate blood supply was confirmed with blanching and refill.  The pedicle was then wrapped with xeroform gauze and dressed appropriately with a telfa and gauze bandage to ensure continued blood supply and protect the attached pedicle. Interpolation Flap Text: A decision was made to reconstruct the defect utilizing an interpolation axial flap and a staged reconstruction.  A telfa template was made of the defect.  This telfa template was then used to outline the interpolation flap.  The donor area for the pedicle flap was then injected with anesthesia.  The flap was excised through the skin and subcutaneous tissue down to the layer of the underlying musculature.  The interpolation flap was carefully excised within this deep plane to maintain its blood supply.  The edges of the donor site were undermined.   The donor site was closed in a primary fashion.  The pedicle was then rotated into position and sutured.  Once the tube was sutured into place, adequate blood supply was confirmed with blanching and refill.  The pedicle was then wrapped with xeroform gauze and dressed appropriately with a telfa and gauze bandage to ensure continued blood supply and protect the attached pedicle. Melolabial Interpolation Flap Text: A decision was made to reconstruct the defect utilizing an interpolation axial flap and a staged reconstruction.  A telfa template was made of the defect.  This telfa template was then used to outline the melolabial interpolation flap.  The donor area for the pedicle flap was then injected with anesthesia.  The flap was excised through the skin and subcutaneous tissue down to the layer of the underlying musculature.  The pedicle flap was carefully excised within this deep plane to maintain its blood supply.  The edges of the donor site were undermined.   The donor site was closed in a primary fashion.  The pedicle was then rotated into position and sutured.  Once the tube was sutured into place, adequate blood supply was confirmed with blanching and refill.  The pedicle was then wrapped with xeroform gauze and dressed appropriately with a telfa and gauze bandage to ensure continued blood supply and protect the attached pedicle. Mastoid Interpolation Flap Text: A decision was made to reconstruct the defect utilizing an interpolation axial flap and a staged reconstruction.  A telfa template was made of the defect.  This telfa template was then used to outline the mastoid interpolation flap.  The donor area for the pedicle flap was then injected with anesthesia.  The flap was excised through the skin and subcutaneous tissue down to the layer of the underlying musculature.  The pedicle flap was carefully excised within this deep plane to maintain its blood supply.  The edges of the donor site were undermined.   The donor site was closed in a primary fashion.  The pedicle was then rotated into position and sutured.  Once the tube was sutured into place, adequate blood supply was confirmed with blanching and refill.  The pedicle was then wrapped with xeroform gauze and dressed appropriately with a telfa and gauze bandage to ensure continued blood supply and protect the attached pedicle. Posterior Auricular Interpolation Flap Text: A decision was made to reconstruct the defect utilizing an interpolation axial flap and a staged reconstruction.  A telfa template was made of the defect.  This telfa template was then used to outline the posterior auricular interpolation flap.  The donor area for the pedicle flap was then injected with anesthesia.  The flap was excised through the skin and subcutaneous tissue down to the layer of the underlying musculature.  The pedicle flap was carefully excised within this deep plane to maintain its blood supply.  The edges of the donor site were undermined.   The donor site was closed in a primary fashion.  The pedicle was then rotated into position and sutured.  Once the tube was sutured into place, adequate blood supply was confirmed with blanching and refill.  The pedicle was then wrapped with xeroform gauze and dressed appropriately with a telfa and gauze bandage to ensure continued blood supply and protect the attached pedicle. Paramedian Forehead Flap Text: A decision was made to reconstruct the defect utilizing an interpolation axial flap and a staged reconstruction.  A telfa template was made of the defect.  This telfa template was then used to outline the paramedian forehead pedicle flap.  The donor area for the pedicle flap was then injected with anesthesia.  The flap was excised through the skin and subcutaneous tissue down to the layer of the underlying musculature.  The pedicle flap was carefully excised within this deep plane to maintain its blood supply.  The edges of the donor site were undermined.   The donor site was closed in a primary fashion.  The pedicle was then rotated into position and sutured.  Once the tube was sutured into place, adequate blood supply was confirmed with blanching and refill.  The pedicle was then wrapped with xeroform gauze and dressed appropriately with a telfa and gauze bandage to ensure continued blood supply and protect the attached pedicle. Cheiloplasty (Less Than 50%) Text: A decision was made to reconstruct the defect with a  cheiloplasty.  The defect was undermined extensively.  Additional obicularis oris muscle was excised with a 15 blade scalpel.  The defect was converted into a full thickness wedge, of less than 50% of the vertical height of the lip, to facilite a better cosmetic result.  Small vessels were then tied off with 5-0 monocyrl. The obicularis oris, superficial fascia, adipose and dermis were then reapproximated.  After the deeper layers were approximated the epidermis was reapproximated with particular care given to realign the vermillion border. Cheiloplasty (Complex) Text: A decision was made to reconstruct the defect with a  cheiloplasty.  The defect was undermined extensively.  Additional obicularis oris muscle was excised with a 15 blade scalpel.  The defect was converted into a full thickness wedge to facilite a better cosmetic result.  Small vessels were then tied off with 5-0 monocyrl. The obicularis oris, superficial fascia, adipose and dermis were then reapproximated.  After the deeper layers were approximated the epidermis was reapproximated with particular care given to realign the vermillion border. Ear Wedge Repair Text: A wedge excision was completed by carrying down an excision through the full thickness of the ear and cartilage with an inward facing Burow's triangle. The wound was then closed in a layered fashion. Full Thickness Lip Wedge Repair (Flap) Text: Given the location of the defect and the proximity to free margins a full thickness wedge repair was deemed most appropriate.  Using a sterile surgical marker, the appropriate repair was drawn incorporating the defect and placing the expected incisions perpendicular to the vermillion border.  The vermillion border was also meticulously outlined to ensure appropriate reapproximation during the repair.  The area thus outlined was incised through and through with a #15 scalpel blade.  The muscularis and dermis were reaproximated with deep sutures following hemostasis. Care was taken to realign the vermillion border before proceeding with the superficial closure.  Once the vermillion was realigned the superfical and mucosal closure was finished. Split-Thickness Skin Graft Text: The defect edges were debeveled with a #15 scalpel blade.  Given the location of the defect, shape of the defect and the proximity to free margins a split thickness skin graft was deemed most appropriate.  Using a sterile surgical marker, the primary defect shape was transferred to the donor site. The split thickness graft was then harvested.  The skin graft was then placed in the primary defect and oriented appropriately. Burow's Graft Text: The defect edges were debeveled with a #15 scalpel blade.  Given the location of the defect, shape of the defect, the proximity to free margins and the presence of a standing cone deformity a Burow's skin graft was deemed most appropriate. The standing cone was removed and this tissue was then trimmed to the shape of the primary defect. The adipose tissue was also removed until only dermis and epidermis were left.  The skin margins of the secondary defect were undermined to an appropriate distance in all directions utilizing iris scissors.  The secondary defect was closed with interrupted buried subcutaneous sutures.  The skin edges were then re-apposed with running  sutures.  The skin graft was then placed in the primary defect and oriented appropriately. Cartilage Graft Text: The defect edges were debeveled with a #15 scalpel blade.  Given the location of the defect, shape of the defect, the fact the defect involved a full thickness cartilage defect a cartilage graft was deemed most appropriate.  An appropriate donor site was identified, cleansed, and anesthetized. The cartilage graft was then harvested and transferred to the recipient site, oriented appropriately and then sutured into place.  The secondary defect was then repaired using a primary closure. Composite Graft Text: The defect edges were debeveled with a #15 scalpel blade.  Given the location of the defect, shape of the defect, the proximity to free margins and the fact the defect was full thickness a composite graft was deemed most appropriate.  The defect was outline and then transferred to the donor site.  A full thickness graft was then excised from the donor site. The graft was then placed in the primary defect, oriented appropriately and then sutured into place.  The secondary defect was then repaired using a primary closure. Epidermal Autograft Text: The defect edges were debeveled with a #15 scalpel blade.  Given the location of the defect, shape of the defect and the proximity to free margins an epidermal autograft was deemed most appropriate.  Using a sterile surgical marker, the primary defect shape was transferred to the donor site. The epidermal graft was then harvested.  The skin graft was then placed in the primary defect and oriented appropriately. Dermal Autograft Text: The defect edges were debeveled with a #15 scalpel blade.  Given the location of the defect, shape of the defect and the proximity to free margins a dermal autograft was deemed most appropriate.  Using a sterile surgical marker, the primary defect shape was transferred to the donor site. The area thus outlined was incised deep to adipose tissue with a #15 scalpel blade.  The harvested graft was then trimmed of adipose and epidermal tissue until only dermis was left.  The skin graft was then placed in the primary defect and oriented appropriately. Skin Substitute Text: The defect edges were debeveled with a #15 scalpel blade.  Given the location of the defect, shape of the defect and the proximity to free margins a skin substitute graft was deemed most appropriate.  The graft material was trimmed to fit the size of the defect. The graft was then placed in the primary defect and oriented appropriately. Tissue Cultured Epidermal Autograft Text: The defect edges were debeveled with a #15 scalpel blade.  Given the location of the defect, shape of the defect and the proximity to free margins a tissue cultured epidermal autograft was deemed most appropriate.  The graft was then trimmed to fit the size of the defect.  The graft was then placed in the primary defect and oriented appropriately. Xenograft Text: The defect edges were debeveled with a #15 scalpel blade.  Given the location of the defect, shape of the defect and the proximity to free margins a xenograft was deemed most appropriate.  The graft was then trimmed to fit the size of the defect.  The graft was then placed in the primary defect and oriented appropriately. Purse String (Simple) Text: Given the location of the defect and the characteristics of the surrounding skin a pursestring closure was deemed most appropriate.  Undermining was performed circumfirentially around the surgical defect.  A purstring suture was then placed and tightened. Purse String (Intermediate) Text: Given the location of the defect and the characteristics of the surrounding skin a pursestring intermediate closure was deemed most appropriate.  Undermining was performed circumfirentially around the surgical defect.  A purstring suture was then placed and tightened. Partial Purse String (Simple) Text: Given the location of the defect and the characteristics of the surrounding skin a simple purse string closure was deemed most appropriate.  Undermining was performed circumfirentially around the surgical defect.  A purse string suture was then placed and tightened. Wound tension only allowed a partial closure of the circular defect. Partial Purse String (Intermediate) Text: Given the location of the defect and the characteristics of the surrounding skin an intermediate purse string closure was deemed most appropriate.  Undermining was performed circumfirentially around the surgical defect.  A purse string suture was then placed and tightened. Wound tension only allowed a partial closure of the circular defect. Localized Dermabrasion With Wire Brush Text: The patient was draped in routine manner.  Localized dermabrasion using 3 x 17 mm wire brush was performed in routine manner to papillary dermis. This spot dermabrasion is being performed to complete skin cancer reconstruction. It also will eliminate the other sun damaged precancerous cells that are known to be part of the regional effect of a lifetime's worth of sun exposure. This localized dermabrasion is therapeutic and should not be considered cosmetic in any regard. Tarsorrhaphy Text: A tarsorrhaphy was performed using Frost sutures. Complex Repair And Flap Additional Text (Will Appearing After The Standard Complex Repair Text): The complex repair was not sufficient to completely close the primary defect. The remaining additional defect was repaired with the flap mentioned below. Complex Repair And Graft Additional Text (Will Appearing After The Standard Complex Repair Text): The complex repair was not sufficient to completely close the primary defect. The remaining additional defect was repaired with the graft mentioned below. Unique Flap 1 Name: Shay Toro Unique Flap 1 Text: Given the location of the defect, shape of the defect and the proximity to free margins a Shay rotation flap was deemed most appropriate.  Using a sterile surgical marker, an appropriate rotation flap was drawn incorporating the defect and placing the expected incisions within the nasal cosmetic unit and extending in a curvilinear fashion along the nasofacial junction and continuing to the superior nasoglabellar junction.  Finishing with a tagential orientation of the incision.   The area thus outlined was incised deep to muscle and undermined in the submuscular plane of tissue with a #10 scalpel blade.  The skin margins were undermined to an appropriate distance in all directions utilizing iris scissors. The flap was rotated into the defect.  A small tricone was removed at the distal tip of the flap. Manual Repair Warning Statement: We plan on removing the manually selected variable below in favor of our much easier automatic structured text blocks found in the previous tab. We decided to do this to help make the flow better and give you the full power of structured data. Manual selection is never going to be ideal in our platform and I would encourage you to avoid using manual selection from this point on, especially since I will be sunsetting this feature. It is important that you do one of two things with the customized text below. First, you can save all of the text in a word file so you can have it for future reference. Second, transfer the text to the appropriate area in the Library tab. Lastly, if there is a flap or graft type which we do not have you need to let us know right away so I can add it in before the variable is hidden. No need to panic, we plan to give you roughly 6 months to make the change. Same Histology In Subsequent Stages Text: The pattern and morphology of the tumor is as described in the first stage. No Residual Tumor Seen Histology Text: There were no malignant cells seen in the sections examined. Inflammation Suggestive Of Cancer Camouflage Histology Text: There was a dense lymphocytic infiltrate which prevented adequate histologic evaluation of adjacent structures. Bcc Histology Text: There were numerous aggregates of basaloid cells. Bcc Infiltrative Histology Text: Irregularly shaped cords and strands of basaloid keratinocytes infiltrate the dermis with a spiky growth pattern. The cells have scant cytoplasm and round dark nuclei. Mitotic figures and apoptotic bodies are evident. The nuclei at the periphery of the islands have a palisaded arrangement. The islands are associated with a fibromyxoid stroma and there is a cleft formation between some of the islands and stroma. Bcc Micronodular Histology Text: Emanating from the epidermis and infiltrating the dermis are irregularly shaped islands of basaloid keratinocytes. The cells have scant cytoplasm and dark round nuclei. Mitotic figures and apoptotic bodies are evident. The nuclei at the periphery of the islands have a palisaded arrangement. The islands are associated with a fibromyxoid stroma and there is cleft formation between some of the islands and stroma. In areas the tumor breaks up into a small, micronodular, infiltrative growth pattern with deeper extension into the dermis. Bcc  Morpheaform/Sclerosing Histology Text: Irregularly shaped cords and strands of basaloid keratinocytes are present within the dermis. In areas the cells assume a single cell strand formation with a highly-infiltrative growth pattern. The cells have scant cytoplasm and round dark nuclei. There are nodular aggregates of darkly staining basophilic cells exhibiting peripheral palisading and stromal retraction. The islands are associated with a dense morpheic stroma. Bcc  Nodular Histology Text: Emanating from the epidermis and infiltrating the dermis are irregularly shaped islands of basaloid keratinocytes. The cells have scant cytoplasm and round dark nuclei. Mitotic figures and apoptotic bodies are evident. The nuclei at the periphery of the islands have a palisaded arrangement. The islands are associated with a fibromyxoid stroma and there is a cleft formation between some of the islands and stroma. Bcc  Nodulocystic Histology Text: Nests of basaloid cells with peripheral palisading associated with a fibromyxoid stroma. Bcc Superficial Histology Text: Arising from the epidermis and superficial hair follicles are small, superficial, multicentric buds of basaloid keratinocytes. The cells have scant cytoplasm and round dark nuclei. Mitotic figures and apoptotic bodies are evident. The nuclei at the periphery of the islands have a palisaded arrangement. The islands are associated with a fibromyxoid stroma and there is cleft formation between some of the islands and stroma. Mixed Superficial And Nodular Bcc Histology Text: Arising from the epidermis and superficial hair follicles are small, superficial, multicentric buds of basaloid keratinocytes. The cells have scant cytoplasm and round dark nuclei. Mitotic figures and apoptotic bodies are evident. The nuclei at the periphery of the islands have a palisaded arrangement. The islands are associated with a fibromyxoid stroma and there is cleft formation between some of the islands and stroma.  Emanating from the epidermis and infiltrating the dermis are irregularly shaped islands of basaloid keratinocytes. The cells have scant cytoplasm and round dark nuclei. Mitotic figures and apoptotic bodies are evident. The nuclei at the periphery of the islands have a palisaded arrangement. The islands are associated with a fibromyxoid stroma and there is a cleft formation between some of the islands and stroma. Mixed Nodular And Infiltrative Bcc Histology Text: Emanating from the epidermis and infiltrating the dermis are irregularly shaped islands of basaloid keratinocytes. The cells have scant cytoplasm and round dark nuclei. Mitotic figures and apoptotic bodies are evident. The nuclei at the periphery of the islands have a palisaded arrangement. The islands are associated with a fibromyxoid stroma and there is a cleft formation between some of the islands and stroma.  Irregularly shaped cords and strands of basaloid keratinocytes infiltrate the dermis with a spiky growth pattern. The cells have scant cytoplasm and round dark nuclei. Mitotic figures and apoptotic bodies are evident. The nuclei at the periphery of the islands have a palisaded arrangement. The islands are associated with a fibromyxoid stroma and there is a cleft formation between some of the islands and stroma. Mixed Nodular And Micronodular Bcc Histology Text: Emanating from the epidermis and infiltrating the dermis are irregularly shaped islands of basaloid keratinocytes. The cells have scant cytoplasm and round dark nuclei. Mitotic figures and apoptotic bodies are evident. The nuclei at the periphery of the islands have a palisaded arrangement. The islands are associated with a fibromyxoid stroma and there is a cleft formation between some of the islands and stroma.  Emanating from the epidermis and infiltrating the dermis are irregularly shaped islands of basaloid keratinocytes. The cells have scant cytoplasm and dark round nuclei. Mitotic figures and apoptotic bodies are evident. The nuclei at the periphery of the islands have a palisaded arrangement. The islands are associated with a fibromyxoid stroma and there is cleft formation between some of the islands and stroma. In areas the tumor breaks up into a small, micronodular, infiltrative growth pattern with deeper extension into the dermis. Metatypical Bcc Histology Text: Multiple islands of malignant cells throughout the dermis, exhibiting both basaloid and squamous differentiation. The areas had Basal Cell Carcinoma, with large and rounded basaloid cells, and Squamous Cell Carcinoma, with polygonal squamoid cells with abundant eosinophilic cytoplasm, large nuclei, and prominent nucleoli. Scc Histology Text: Arising from the epidermis is a keratin-producing proliferation of atypical keratinocytes with invasion into the underlying dermis. Mitoses and atypical forms are evident. Intercellular bridge and keratin dede formation are evident. Scc Moderately Differentiated Histology Text: Arising from the epidermis is a proliferation of atypical keratinocytes with mitoses. Invasion into the dermis is noted. In areas the tumor is keratin producing and in other areas the tumor is less well differentiated. Scc Poorly Differentiated Histology Text: Arising from the epidermis is a pleomorphic proliferation of atypical keratinocytes with mitoses. Invasion into the dermis is noted. Scc Ka Subtype Histology Text: There is a symmetric, crateriform nodule with a central keratinous core. Buttress formation is noted at the peripheries of the nodule. The keratinocytes are enlarged with glassy, eosinophilic cytoplasm. The atypical keratinocytes infiltrate the dermis in an irregular fashion and are associated with inflammation including lymphomononuclear cells and eosinophils. Scc In Situ Histology Text: Within the epidermis is a full-thickness proliferation of atypical keratinocytes with mitoses. The overlying stratum corneum demonstrates parakeratosis. No invasion is noted. Scc In Situ With Follicular Extension Histology Text: Within the epidermis is a full thickness proliferation of atypical keratinocytes with mitoses. The overlying stratum corneum demonstrates parakeratosis. No invasion is noted. The atypical cells extend down hair follicle structures. Lentigo Maligna Histology Text: There is an intraepidermal melanocytic proliferation arranged as nests as well as individual cells. The individual cells are crowded along the dermal-epidermal junction with well-developed confluent growth. Pagetoid migration to the upper levels of the epidermis is noted. The individual melanocytes are enlarged and hyperchromatic with irregular nuclear contours and coarse chromatin. Within the dermis there are rare nests of similar appearing melanocytes that are associated with fibroplasia, mild chronic inflammation, and solar elastosis. Afx Histology Text: Present within the dermis is a highly cellular neoplasm composed of pleomorphic spindled and epithelioid cells. The cells are arranged in a haphazard fashion and associated with mitotic figures, including atypical forms. Mart-1 - Positive Histology Text: MART-1 staining demonstrates areas of higher density and clustering of melanocytes with Pagetoid spread upwards within the epidermis. The surgical margins are positive for tumor cells. Mart-1 - Negative Histology Text: MART-1 staining demonstrates a normal density and pattern of melanocytes along the dermal-epidermal junction. The surgical margins are negative for tumor cells. Information: Selecting Yes will display possible errors in your note based on the variables you have selected. This validation is only offered as a suggestion for you. PLEASE NOTE THAT THE VALIDATION TEXT WILL BE REMOVED WHEN YOU FINALIZE YOUR NOTE. IF YOU WANT TO FAX A PRELIMINARY NOTE YOU WILL NEED TO TOGGLE THIS TO 'NO' IF YOU DO NOT WANT IT IN YOUR FAXED NOTE.

## 2022-07-13 ENCOUNTER — EMERGENCY (EMERGENCY)
Facility: HOSPITAL | Age: 42
LOS: 0 days | Discharge: HOME | End: 2022-07-13
Attending: EMERGENCY MEDICINE | Admitting: EMERGENCY MEDICINE

## 2022-07-13 VITALS
TEMPERATURE: 98 F | HEART RATE: 67 BPM | DIASTOLIC BLOOD PRESSURE: 69 MMHG | OXYGEN SATURATION: 100 % | SYSTOLIC BLOOD PRESSURE: 135 MMHG | RESPIRATION RATE: 17 BRPM

## 2022-07-13 DIAGNOSIS — E89.0 POSTPROCEDURAL HYPOTHYROIDISM: ICD-10-CM
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
## 2022-07-13 DIAGNOSIS — Z88.1 ALLERGY STATUS TO OTHER ANTIBIOTIC AGENTS STATUS: ICD-10-CM
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
## 2022-07-13 DIAGNOSIS — L53.9 ERYTHEMATOUS CONDITION, UNSPECIFIED: ICD-10-CM

## 2022-07-13 DIAGNOSIS — S91.351A OPEN BITE, RIGHT FOOT, INITIAL ENCOUNTER: ICD-10-CM

## 2022-07-13 DIAGNOSIS — Z88.8 ALLERGY STATUS TO OTHER DRUGS, MEDICAMENTS AND BIOLOGICAL SUBSTANCES STATUS: ICD-10-CM

## 2022-07-13 DIAGNOSIS — M79.671 PAIN IN RIGHT FOOT: ICD-10-CM

## 2022-07-13 DIAGNOSIS — M79.89 OTHER SPECIFIED SOFT TISSUE DISORDERS: ICD-10-CM

## 2022-07-13 DIAGNOSIS — E89.0 POSTPROCEDURAL HYPOTHYROIDISM: Chronic | ICD-10-CM

## 2022-07-13 DIAGNOSIS — Z88.6 ALLERGY STATUS TO ANALGESIC AGENT: ICD-10-CM

## 2022-07-13 LAB
ALBUMIN SERPL ELPH-MCNC: 4.1 G/DL — SIGNIFICANT CHANGE UP (ref 3.5–5.2)
ALP SERPL-CCNC: 60 U/L — SIGNIFICANT CHANGE UP (ref 30–115)
ALT FLD-CCNC: 16 U/L — SIGNIFICANT CHANGE UP (ref 0–41)
ANION GAP SERPL CALC-SCNC: 13 MMOL/L — SIGNIFICANT CHANGE UP (ref 7–14)
AST SERPL-CCNC: 39 U/L — SIGNIFICANT CHANGE UP (ref 0–41)
BASOPHILS # BLD AUTO: 0.02 K/UL — SIGNIFICANT CHANGE UP (ref 0–0.2)
BASOPHILS NFR BLD AUTO: 0.3 % — SIGNIFICANT CHANGE UP (ref 0–1)
BILIRUB SERPL-MCNC: <0.2 MG/DL — SIGNIFICANT CHANGE UP (ref 0.2–1.2)
BUN SERPL-MCNC: 12 MG/DL — SIGNIFICANT CHANGE UP (ref 10–20)
CALCIUM SERPL-MCNC: 8.9 MG/DL — SIGNIFICANT CHANGE UP (ref 8.5–10.1)
CHLORIDE SERPL-SCNC: 105 MMOL/L — SIGNIFICANT CHANGE UP (ref 98–110)
CO2 SERPL-SCNC: 24 MMOL/L — SIGNIFICANT CHANGE UP (ref 17–32)
CREAT SERPL-MCNC: 0.9 MG/DL — SIGNIFICANT CHANGE UP (ref 0.7–1.5)
EGFR: 82 ML/MIN/1.73M2 — SIGNIFICANT CHANGE UP
EOSINOPHIL # BLD AUTO: 0.22 K/UL — SIGNIFICANT CHANGE UP (ref 0–0.7)
EOSINOPHIL NFR BLD AUTO: 3 % — SIGNIFICANT CHANGE UP (ref 0–8)
GLUCOSE SERPL-MCNC: 85 MG/DL — SIGNIFICANT CHANGE UP (ref 70–99)
HCG SERPL QL: NEGATIVE — SIGNIFICANT CHANGE UP
HCT VFR BLD CALC: 29.4 % — LOW (ref 37–47)
HGB BLD-MCNC: 9.9 G/DL — LOW (ref 12–16)
IMM GRANULOCYTES NFR BLD AUTO: 0.1 % — SIGNIFICANT CHANGE UP (ref 0.1–0.3)
LACTATE SERPL-SCNC: 1.9 MMOL/L — SIGNIFICANT CHANGE UP (ref 0.7–2)
LYMPHOCYTES # BLD AUTO: 3.02 K/UL — SIGNIFICANT CHANGE UP (ref 1.2–3.4)
LYMPHOCYTES # BLD AUTO: 41.6 % — SIGNIFICANT CHANGE UP (ref 20.5–51.1)
MCHC RBC-ENTMCNC: 29.6 PG — SIGNIFICANT CHANGE UP (ref 27–31)
MCHC RBC-ENTMCNC: 33.7 G/DL — SIGNIFICANT CHANGE UP (ref 32–37)
MCV RBC AUTO: 88 FL — SIGNIFICANT CHANGE UP (ref 81–99)
MONOCYTES # BLD AUTO: 0.63 K/UL — HIGH (ref 0.1–0.6)
MONOCYTES NFR BLD AUTO: 8.7 % — SIGNIFICANT CHANGE UP (ref 1.7–9.3)
NEUTROPHILS # BLD AUTO: 3.36 K/UL — SIGNIFICANT CHANGE UP (ref 1.4–6.5)
NEUTROPHILS NFR BLD AUTO: 46.3 % — SIGNIFICANT CHANGE UP (ref 42.2–75.2)
NRBC # BLD: 0 /100 WBCS — SIGNIFICANT CHANGE UP (ref 0–0)
PLATELET # BLD AUTO: 359 K/UL — SIGNIFICANT CHANGE UP (ref 130–400)
POTASSIUM SERPL-MCNC: 5.2 MMOL/L — HIGH (ref 3.5–5)
POTASSIUM SERPL-SCNC: 5.2 MMOL/L — HIGH (ref 3.5–5)
PROT SERPL-MCNC: 6.8 G/DL — SIGNIFICANT CHANGE UP (ref 6–8)
RBC # BLD: 3.34 M/UL — LOW (ref 4.2–5.4)
RBC # FLD: 13.2 % — SIGNIFICANT CHANGE UP (ref 11.5–14.5)
SODIUM SERPL-SCNC: 142 MMOL/L — SIGNIFICANT CHANGE UP (ref 135–146)
WBC # BLD: 7.26 K/UL — SIGNIFICANT CHANGE UP (ref 4.8–10.8)
WBC # FLD AUTO: 7.26 K/UL — SIGNIFICANT CHANGE UP (ref 4.8–10.8)

## 2022-07-13 PROCEDURE — 73630 X-RAY EXAM OF FOOT: CPT | Mod: 26,RT

## 2022-07-13 PROCEDURE — 99284 EMERGENCY DEPT VISIT MOD MDM: CPT

## 2022-07-13 RX ORDER — AMPICILLIN SODIUM AND SULBACTAM SODIUM 250; 125 MG/ML; MG/ML
3 INJECTION, POWDER, FOR SUSPENSION INTRAMUSCULAR; INTRAVENOUS ONCE
Refills: 0 | Status: COMPLETED | OUTPATIENT
Start: 2022-07-13 | End: 2022-07-13

## 2022-07-13 RX ORDER — CEFAZOLIN SODIUM 1 G
2000 VIAL (EA) INJECTION ONCE
Refills: 0 | Status: DISCONTINUED | OUTPATIENT
Start: 2022-07-13 | End: 2022-07-13

## 2022-07-13 RX ORDER — SODIUM CHLORIDE 9 MG/ML
1000 INJECTION INTRAMUSCULAR; INTRAVENOUS; SUBCUTANEOUS ONCE
Refills: 0 | Status: COMPLETED | OUTPATIENT
Start: 2022-07-13 | End: 2022-07-13

## 2022-07-13 RX ADMIN — SODIUM CHLORIDE 1000 MILLILITER(S): 9 INJECTION INTRAMUSCULAR; INTRAVENOUS; SUBCUTANEOUS at 14:56

## 2022-07-13 RX ADMIN — AMPICILLIN SODIUM AND SULBACTAM SODIUM 200 GRAM(S): 250; 125 INJECTION, POWDER, FOR SUSPENSION INTRAMUSCULAR; INTRAVENOUS at 14:56

## 2022-07-13 NOTE — ED PROVIDER NOTE - NSFOLLOWUPINSTRUCTIONS_ED_ALL_ED_FT
Animal Bite, Adult    Animal bites range from mild to serious. An animal bite can result in any of these injuries:  A scratch.  A deep, open cut.  A puncture of the skin.  A crush injury.  Tearing away of the skin or a body part.  A bone injury.  A small bite from a house pet is usually less serious than a bite from a stray or wild animal, such as a raccoon, crowley, skunk, or bat. That is because stray and wild animals have a higher risk of carrying a serious infection called rabies, which can be passed to humans through a bite.    What increases the risk?  You are more likely to be bitten by an animal if:  You are around unfamiliar pets.  You disturb an animal when it is eating, sleeping, or caring for its babies.  You are outdoors in a place where small, wild animals roam freely.  What are the signs or symptoms?  Common symptoms of an animal bite include:  Pain.  Bleeding.  Swelling.  Bruising.  How is this diagnosed?  This condition may be diagnosed based on a physical exam and medical history. Your health care provider will examine your wound and ask for details about the animal and how the bite happened. You may also have tests, such as:  Blood tests to check for infection.  X-rays to check for damage to bones or joints.  Taking a fluid sample from your wound and checking it for infection (culture test).  How is this treated?  Treatment varies depending on the type of animal, where the bite is on your body, and your medical history. Treatment may include:  Caring for the wound. This often includes cleaning the wound, rinsing out (flushing) the wound with saline solution, and applying a bandage (dressing). In some cases, the wound may be closed with stitches (sutures), staples, skin glue, or adhesive strips.  Antibiotic medicine to prevent or treat infection. This medicine may be prescribed in pill or ointment form. If the bite area becomes infected, the medicine may be given through an IV.  A tetanus shot to prevent tetanus infection.  Rabies treatment to prevent rabies infection. This will be done if the animal could have rabies.  Surgery. This may be done if a bite gets infected or if there is damage that needs to be repaired.  Follow these instructions at home:  Wound care      Follow instructions from your health care provider about how to take care of your wound. Make sure you:  Wash your hands with soap and water before you change your dressing. If soap and water are not available, use hand .  Change your dressing as told by your health care provider.  Leave sutures, skin glue, or adhesive strips in place. These skin closures may need to stay in place for 2 weeks or longer. If adhesive strip edges start to loosen and curl up, you may trim the loose edges. Do not remove adhesive strips completely unless your health care provider tells you to do that.  Check your wound every day for signs of infection. Check for:  More redness, swelling, or pain.  More fluid or blood.  Warmth.  Pus or a bad smell.  Medicines     Take or apply over-the-counter and prescription medicines only as told by your health care provider.  If you were prescribed an antibiotic, take or apply it as told by your health care provider. Do not stop using the antibiotic even if your condition improves.  General instructions     Image   Keep the injured area raised (elevated) above the level of your heart while you are sitting or lying down, if this is possible.  If directed, put ice on the injured area.  Put ice in a plastic bag.  Place a towel between your skin and the bag.  Leave the ice on for 20 minutes, 2–3 times per day.  Keep all follow-up visits as told by your health care provider. This is important.  Contact a health care provider if:  You have more redness, swelling, or pain around your wound.  Your wound feels warm to the touch.  You have a fever or chills.  You have a general feeling of sickness (malaise).  You feel nauseous or you vomit.  You have pain that does not get better.  Get help right away if:  You have a red streak that leads away from your wound.  You have non-clear fluid or more blood coming from your wound.  There is pus or a bad smell coming from your wound.  You have trouble moving your injured area.  You have numbness or tingling that extends beyond the wound.  Summary  Animal bites can range from mild to serious. An animal bite can cause a scratch on the skin, a deep open cut, a puncture of the skin, a crush injury, tearing away of the skin or a body part, or a bone injury.  Your health care provider will examine your wound and ask for details about the animal and how the bite happened.  You may also have tests such as a blood test, X-ray, or testing of a fluid sample from your wound (culture test).  Treatment may include wound care, antibiotic medicine, a tetanus shot, and rabies treatment if the animal could have rabies.  This information is not intended to replace advice given to you by your health care provider. Make sure you discuss any questions you have with your health care provider.

## 2022-07-13 NOTE — ED PROVIDER NOTE - CLINICAL SUMMARY MEDICAL DECISION MAKING FREE TEXT BOX
42 female recently returned from [place] here for presumed cat bite. Had screening labs imaging supportive care medications and reevaluation, treatment for leg cellulitis, plan discussed with patient, will discharge with outpatient management and return and follow up instructions.

## 2022-07-13 NOTE — ED PROVIDER NOTE - NS ED ROS FT
Constitutional: No fever, chills.  Eyes:  No visual changes  ENMT:  No neck pain  Cardiac:  No chest pain  Respiratory:  No cough, SOB  GI:  No nausea, vomiting, diarrhea, abdominal pain.  :  No dysuria, hematuria  MS:  No back pain.  Neuro:  No headache or lightheadedness  Skin:  (+)R foot redness, swelling, cat scratch  Endocrine: No history of thyroid disease or diabetes.  Except as documented in the HPI,  all other systems are negative.

## 2022-07-13 NOTE — ED PROVIDER NOTE - NS ED ATTENDING STATEMENT MOD
This was a shared visit with the JOVANNA. I reviewed and verified the documentation and independently performed the documented:

## 2022-07-13 NOTE — ED PROVIDER NOTE - OBJECTIVE STATEMENT
41 yo F pmhx thyroidectomy presenting to the ED for evaluation of R foot swelling, redness, pain x 2 days s/p cat scratch while in [place] 2 days ago. Pt reports a stray cat scratched her and since that time has had pain, swelling, redness to R foot now spreading up RLE. Denies fever, chills, numbness/tingling, weakness.

## 2022-07-13 NOTE — ED PROVIDER NOTE - PHYSICAL EXAMINATION
GENERAL: Well-nourished, Well-developed. NAD.  HEAD: No visible or palpable bumps or hematomas. No ecchymosis behind ears B/L.  Eyes: PERRLA, EOMI. No asymmetry. No nystagmus. No conjunctival injection. Non-icteric sclera.  ENMT: MMM.   Neck: Supple. FROM  CVS: RRR  MSK: (+)swelling to R foot/ankle with cellulitis, FROM.  Skin: (+)R foot cat scratch visible with surrounding warmth, erythema and swelling. no crepitus or discharge.   EXT: Radial and pedal pulses present B/L. No calf tenderness or swelling B/L. No palpable cords. No pedal edema B/L. GENERAL: Well-nourished, Well-developed. NAD.  HEAD: No visible or palpable bumps or hematomas. No ecchymosis behind ears B/L.  Eyes: PERRLA, EOMI. No asymmetry. No nystagmus. No conjunctival injection. Non-icteric sclera.  ENMT: MMM.   Neck: Supple. FROM  CVS: RRR  MSK: (+)swelling to R foot/ankle with cellulitis, FROM.  Skin: (+)R foot cat scratch/ puncture bite visible with surrounding warmth, erythema and swelling. no crepitus or discharge.   EXT: Radial and pedal pulses present B/L. No calf tenderness or swelling B/L. No palpable cords. No pedal edema B/L.

## 2022-07-13 NOTE — ED PROVIDER NOTE - PATIENT PORTAL LINK FT
You can access the FollowMyHealth Patient Portal offered by [facility] by registering at the following website: [email or web address]. By joining [facility]’s FollowMyHealth portal, you will also be able to view your health information using other applications (apps) compatible with our system.

## 2022-07-13 NOTE — ED PROVIDER NOTE - ATTENDING APP SHARED VISIT CONTRIBUTION OF CARE
I personally evaluated the patient. I reviewed the Resident’s or Physician Assistant’s note (as assigned above), and agree with the findings and plan except as documented in my note.     42 female here for evaluation of right foot pain and redness after an animal bit her in [place]. She says she saw a domesticated cat at the time, mostly unsure about the actual animal that bit her, and that even domesticated cats "don't have the care" similar to [place].     No bruising or facial palsies noted. No other symptoms. Denies lymph node swelling.     Unresponsive to Rx ABX amoxicillin.     ROS otherwise unremarkable    PE: female in no distress. CV: pulses intact. CHEST: normal work of breathing. ABD: nondistended. SKIN: right dorsal foot noted with 3 punctate injection marks consistent with animal bite, similar to cat bite based on size. Local swelling and redness noted. no streakting. EXT: FROM. no adenopathy noted NEURO: AAO 3 no focal deficits. HEENT: mucosa normal     Impression: animal bite    Plan: IV labs imaging supportive care and reevaluation

## 2022-07-13 NOTE — ED PROVIDER NOTE - CARE PROVIDER_API CALL
April Montoya)  Infectious Disease; Internal Medicine  [address] [place] [address]  Phone: [phone]  Fax: [phone]  Follow Up Time: 1-3 Days

## 2022-07-19 PROBLEM — H40.003 GLAUCOMA SUSPECT OF BOTH EYES: Status: RESOLVED | Noted: 2019-04-03 | Resolved: 2022-07-19

## 2023-02-10 ENCOUNTER — APPOINTMENT (OUTPATIENT)
Dept: INTERNAL MEDICINE | Facility: CLINIC | Age: 43
End: 2023-02-10
Payer: MEDICAID

## 2023-02-10 VITALS
HEART RATE: 76 BPM | BODY MASS INDEX: 29.05 KG/M2 | WEIGHT: 180 LBS | DIASTOLIC BLOOD PRESSURE: 62 MMHG | TEMPERATURE: 98.2 F | SYSTOLIC BLOOD PRESSURE: 128 MMHG | OXYGEN SATURATION: 97 %

## 2023-02-10 DIAGNOSIS — H02.421: ICD-10-CM

## 2023-02-10 PROCEDURE — 99204 OFFICE O/P NEW MOD 45 MIN: CPT

## 2023-02-10 RX ORDER — TRANEXAMIC ACID 650 MG/1
650 TABLET ORAL 3 TIMES DAILY
Qty: 30 | Refills: 2 | Status: DISCONTINUED | COMMUNITY
Start: 2020-07-08 | End: 2023-02-10

## 2023-02-11 ENCOUNTER — TRANSCRIPTION ENCOUNTER (OUTPATIENT)
Age: 43
End: 2023-02-11

## 2023-02-16 NOTE — ASSESSMENT
[Patient Optimized for Surgery] : Patient optimized for surgery [No Further Testing Recommended] : no further testing recommended [As per surgery] : as per surgery [FreeTextEntry4] : Ms. CHRISTY is a 43 year yo female with no h/o CAD and good exercise tolerance. She denies CP, palpitation or SOB and her EKG today was normal. She does not take blood thinners and denies sleep apnea or urinary  symptoms. She does not have a h/o DVT. She will continue the prescription medications perioperatively as instructed. The morning of the procedure she will not take any pills.\par \par Ms. CHRISTY has a low risk for perioperative cardiovascular complications and will undergo the ambulatory procedure as planned.\par \par  ***More than 50% of the face to face time was devoted to counseling and/or coordination of care. The discussion and/or coordination of care included: perioperative medication management.\par \par

## 2023-02-16 NOTE — HISTORY OF PRESENT ILLNESS
[No Pertinent Cardiac History] : no history of aortic stenosis, atrial fibrillation, coronary artery disease, recent myocardial infarction, or implantable device/pacemaker [No Pertinent Pulmonary History] : no history of asthma, COPD, sleep apnea, or smoking [No Adverse Anesthesia Reaction] : no adverse anesthesia reaction in self or family member [(Patient denies any chest pain, claudication, dyspnea on exertion, orthopnea, palpitations or syncope)] : Patient denies any chest pain, claudication, dyspnea on exertion, orthopnea, palpitations or syncope [Good (7-10 METs)] : Good (7-10 METs) [Chronic Anticoagulation] : no chronic anticoagulation [Chronic Kidney Disease] : no chronic kidney disease [Diabetes] : no diabetes [FreeTextEntry1] : abdominoplasty  [FreeTextEntry2] : 3/6 [FreeTextEntry3] : dr Don [FreeTextEntry4] : Erlin  comes in for medical evaluation prior to above procedure. Last seen in office in  2019 and she gained 20 lbs since. She feels well , now works a as a travel nurse.

## 2023-02-16 NOTE — PHYSICAL EXAM
[Normal] : normal gait, coordination grossly intact, no focal deficits [de-identified] : increased panus

## 2023-03-27 ENCOUNTER — APPOINTMENT (OUTPATIENT)
Dept: INTERNAL MEDICINE | Facility: CLINIC | Age: 43
End: 2023-03-27
Payer: MEDICAID

## 2023-03-27 ENCOUNTER — APPOINTMENT (OUTPATIENT)
Dept: ENDOCRINOLOGY | Facility: CLINIC | Age: 43
End: 2023-03-27
Payer: MEDICAID

## 2023-03-27 ENCOUNTER — LABORATORY RESULT (OUTPATIENT)
Age: 43
End: 2023-03-27

## 2023-03-27 VITALS
WEIGHT: 176 LBS | DIASTOLIC BLOOD PRESSURE: 76 MMHG | HEIGHT: 66 IN | SYSTOLIC BLOOD PRESSURE: 122 MMHG | BODY MASS INDEX: 28.28 KG/M2 | HEART RATE: 76 BPM | OXYGEN SATURATION: 98 % | TEMPERATURE: 97.6 F

## 2023-03-27 VITALS
DIASTOLIC BLOOD PRESSURE: 74 MMHG | HEIGHT: 66 IN | WEIGHT: 176 LBS | SYSTOLIC BLOOD PRESSURE: 120 MMHG | BODY MASS INDEX: 28.28 KG/M2 | OXYGEN SATURATION: 98 % | HEART RATE: 74 BPM

## 2023-03-27 DIAGNOSIS — R92.2 INCONCLUSIVE MAMMOGRAM: ICD-10-CM

## 2023-03-27 DIAGNOSIS — E05.00 THYROTOXICOSIS WITH DIFFUSE GOITER W/OUT THYROTOXIC CRISIS OR STORM: ICD-10-CM

## 2023-03-27 DIAGNOSIS — Z00.01 ENCOUNTER FOR GENERAL ADULT MEDICAL EXAMINATION WITH ABNORMAL FINDINGS: ICD-10-CM

## 2023-03-27 DIAGNOSIS — Z92.89 PERSONAL HISTORY OF OTHER MEDICAL TREATMENT: ICD-10-CM

## 2023-03-27 DIAGNOSIS — Z23 ENCOUNTER FOR IMMUNIZATION: ICD-10-CM

## 2023-03-27 DIAGNOSIS — Z01.818 ENCOUNTER FOR OTHER PREPROCEDURAL EXAMINATION: ICD-10-CM

## 2023-03-27 DIAGNOSIS — Z92.29 PERSONAL HISTORY OF OTHER DRUG THERAPY: ICD-10-CM

## 2023-03-27 DIAGNOSIS — E03.9 HYPOTHYROIDISM, UNSPECIFIED: ICD-10-CM

## 2023-03-27 DIAGNOSIS — D50.9 IRON DEFICIENCY ANEMIA, UNSPECIFIED: ICD-10-CM

## 2023-03-27 DIAGNOSIS — N92.0 EXCESSIVE AND FREQUENT MENSTRUATION WITH REGULAR CYCLE: ICD-10-CM

## 2023-03-27 DIAGNOSIS — H05.20 UNSPECIFIED EXOPHTHALMOS: ICD-10-CM

## 2023-03-27 DIAGNOSIS — E66.9 OBESITY, UNSPECIFIED: ICD-10-CM

## 2023-03-27 PROCEDURE — 36415 COLL VENOUS BLD VENIPUNCTURE: CPT

## 2023-03-27 PROCEDURE — 99214 OFFICE O/P EST MOD 30 MIN: CPT

## 2023-03-27 PROCEDURE — 99396 PREV VISIT EST AGE 40-64: CPT | Mod: 25

## 2023-03-27 RX ORDER — TRIAMCINOLONE ACETONIDE 1 MG/G
0.1 CREAM TOPICAL TWICE DAILY
Qty: 1 | Refills: 3 | Status: ACTIVE | COMMUNITY
Start: 2023-03-27 | End: 1900-01-01

## 2023-03-27 RX ORDER — CHLORHEXIDINE GLUCONATE 4 %
400 LIQUID (ML) TOPICAL DAILY
Refills: 0 | Status: ACTIVE | COMMUNITY
Start: 2023-03-27

## 2023-03-27 RX ORDER — LEVOTHYROXINE SODIUM 0.14 MG/1
137 TABLET ORAL
Qty: 90 | Refills: 3 | Status: ACTIVE | COMMUNITY
Start: 2018-11-29 | End: 1900-01-01

## 2023-03-27 NOTE — HISTORY OF PRESENT ILLNESS
[FreeTextEntry1] : Mar 27, 2023     in person\par \par PCP:   Dr. Nalini Neol (saw earlier today) \par \par  CC: 2016  Hyperthyroid (Graves' disease with ophthalmopathy) \par  11/9/2018: thyroidectomy (Dr. Melchor)  -> hypothyroid \par \par 42 yo originally hyperthryoid and now visits for hypothyroidism post thyroidectomy at [facility] in 2018.\par Taking levothyroxine 137 mcg daily from [employer].\par Works at [employer] - on [employer] service - recently returned from trip to [place].\par Most recent labs at [facility] 2/10/23 included TSH 0.83  T4 9.2 \par \par Recent trip to [place], home of [place]   \par Ran out of LT4 a week ago.   \par Renew 137 and repeat labs end of January and ROV in January\par \par : :\par Constitutional:  Alert, well nourished, healthy appearance, no acute distress \par Eyes:  No proptosis, no stare\par Thyroid:\par Pulmonary:  No respiratory distress, no accessory muscle use; normal rate and effort\par Cardiac:  Normal rate\par Vascular: \par Endocrine:  No stigmata of Cushing’s Syndrome\par Musculoskeletal:  Normal gait, no involuntary movements\par Neurology:  No tremors\par Affect/Mood/Psych:  Oriented x 3; normal affect, normal insight/judgement, normal mood \par .\par \par Impression:  Hypothyroidism well controlled.  \par \par \par May 20, 2020    VideoChat - Facetime to \par \par PCP:  Dr. Nalini Noel\par \par CC:  Hypothyroid post thyroidectomy for Graves Disease.\par \par Feels well.\par TSH is elevated.\par Needs dose of levothyroxine increased.  \par \par \par \par \par Oct 23, 2020      Videochat  - \par \par Recent trip to [place], home of [place]   \par Ran out of LT4 a week ago.   \par Renew 137 and repeat labs end of January and ROV in January\par \par \par May 20, 2020    VideoChat - Facetime to \par \par PCP:  Dr. Nalini Noel\par \par CC:  Hypothyroid post thyroidectomy for Graves Disease.\par \par Feels well.\par TSH is elevated.\par Needs dose of levothyroxine increased.  \par \par Rx sent to [facility] at [address]\par She will ROV end of July after [facility] TFTs a few days before\par \par \par \par \par \par Apr 13, 2020\par \par This is a 21+ minute Tele-Phonic encounter with an established patient which was initiated by the patient during a time scheduled for a visit and the patient is aware that this may be a billable encounter.  The patient has not seen a provider of my specialty (Endocrinology) within out group in the past 7 days and this encounter is not anticipated to result in a scheduled in-person visit within he next 7 days.\par The reason for this Tele-Phonic encounter is listed below under "CC:"\par Verbal consent was discussed and obtained from the patient for this visit:  "You have chosen to receive care through the use of tele-media or telephone.   This enables health care providers at different locations to provide safe, effective, and convenient care through the use of technology.  Please note this is a billable encounter.  As with any health care service, there are risks associated with the use of tele-media or telephone, including equipment failure, poor image and/or resolution, and  issues.  You understand that I cannot physically examine you and that you may need to come to the office to complete the assessment.\par \par Patient agreed verbally to understanding the risks, benefits, alternatives of Tele-Media and telephone as explained.  All questions regarding tele-media and telephone encounters were answered.\par \par Apr 13, 2020\par \par .\par  CC: Hyperthyroid (Graves' disease with ophthalmopathy) \par  11/9/2018: thyroidectomy \par \par Taking levothyroxine 137 mcg daily from [facility] [address].\par Feels well.\par Last set of TFTs July 2019 in good range.\par \par Plan:  Updated labs at [facility] this week.\par Renew LT4\par ROV 8 months.  \par \par July 29, 2019\par \par PCP: Dr. Nalini Noel \par  Gyn: [facility] \par  Surgeon: Dr. Kyle Melchor \par  Eyes:  Dr. Berry Henderson - ref to glaucoma doctor as well as at [facility] for lid retraction OS and will go to [place] Nov 8, 2019\par .\par  CC: Hyperthyroid (Graves' disease with ophthalmopathy) \par  11/9/2018: thyroidectomy \par \par 38 yo working night shift at [facility] [employer].  \par Now taking levothyroxine 125 mcg daily.\par No double vision, blurry vision   \par \par Impression:  Thyroid surgery was November and by March TSH was 2.57 on levothyroxine\par 125 mcg daily.\par \par Plan:  TSI, TBII, TFTs today and ROV in six months.   \par \par \par \par \par \par March 29, 2019\par \par  PCP: Dr. Nalini Noel (to see)\par  Gyn: [facility] (to see)\par  Surgeon: Dr. Kyle Melchor \par .\par  CC: Hyperthyroid (Graves' disease with ophthalmopathy) \par  11/9/2018: thyroidectomy \par \par On levothyroxine 125 mcg daily post thyroidectomy in setting of hyperthyroidism due to Graves' Disease with ophthalmopathy. \par \par November 9, 2018:\par  "FINAL DIAGNOSIS\par  \par A. Delphian lymph node, excision:\par 	One benign reactive lymph node.\par \par B. Thyroid gland, total thyroidectomy:\par 	Thyroid tissue showing areas of mild hyperplastic change, mild fibrosis and few\par  minute hyperplastic-type nodules, consistent with treated diffuse hyperplasia\par  (Graves disease).	\par :"\par \par Impression: Graves' disease with ophthalmopathy post thyroidectomy.\par \par Plan: UPdated thyroid lab tests on April 1.\par See Dr. Berry Bah for her advice. \par ROV here in about 8 weeks.\par \par \par \par \par \par Previous notes from [employer] appended below.\par \par \par October 19, 2018\par .\par  PCP: Dr. Nalini Noel (to see)\par  Gyn: [facility] (to see)\par  Surgeon: Dr. Kyle Melchor \par .\par  CC: Hyperthyroid (Graves' disease with ophthalmopathy) \par .\par  37 yo on methimazole (started 7/19/2018) for hyperthyroidism due to Graves' disease. Because of her history of ophthalmopathy, she is not a good candidate for I-131 treatment and she is not likely to go into a prolonged remission from methimazole now that the condition has returned. Thus she met with Dr. Melchor for his opinion regarding thyroidectomy and she is scheduled for surgery on November 8. \par .\par  Currently taking methimazole 10 mg daily.\par .\par  Impression: TFTs in good range, although recent TSH still suppressed.\par .\par  Plan: Increase the methimazole to 10 mg x 2 tabs daily for now until surgery. TFTs a few days before surgery and call me for results. \par .\par  ==\par .\par  Sept 14, 2018\par .\par  PCP: Dr. Nalini Noel (to see)\par  Gyn: [facility] (to see)\par  Surgeon: Dr. Kyle Melchor (to see)\par .\par  Taking methimazole 10 mg daily. (started 7/19/2018)\par  Feels well.\par  Because of the eye symptoms from Graves' disease, she is not a good candidate for I-131 and she is not likely to go into a remission and she is interested in being considered for thyroid surgery.\par  She is scheduled to meet with Dr. Melchor later today. \par .\par  Labs on\par  8/18 included T3 135 which is down to 108 by 9/9\par  T4 on 8/18 was 11 and is now 9.2 by 9/9\par  TSH remains suppressed.\par .\par  Impression: Doing well on methimazole 10 mg daily and propranolol 5 mg BID\par .\par  Plan: TFTs before next visit here in one month. \par .\par  ==\par  /\par  August 17, 2018\par .\par  PCP: Dr. 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 Stopped working there and noted R sided proptosis. Saw ophthalmologist in [facility] and went to endocrinologist in [facility]. Put on prednisone, antithyroid medication and "BP" med. After a few months, her R eye was still proptotic. \par  Sought medical advice in [place] at [facility]. \par .\par  Saw Dr. Reji Mchugh at [facility]. Started on methimazole 10 mg daily and then tapered off and was able to stop about a year ago.\par  She underwent R eye surgery. by Dr. Brooks.. Dr. Brooks also lowered the eyelid - she did not want additional surgery. \par .\par  Now she notices some prominence of Left eye and Dr. Brooks gave her botox to lower L lid. \par .\par  She stopped into an [facility] in [facility], [address]  and had TFTs about 3 weeks ago and was told that the overactivity had returned. TSH was 0.01, free T4 was 2.3 (0.8 - 1.8, total T3 162 (). \par .\par  She has been taking methiamzole 10 mg daily since 7/19/2018. By Dr. Alonso Branch of the [facility].\par .\par  Impression: She is aware that she is not a good candidate for I-131. She also knows that she is not likely to go into a prolonged remission from the hyperthyroidism from methimazole. Thus she says she is interested to obtain a surgical opinion.\par .\par  Plan: She will continue the methimazole 10 mg daily for now and I reviewed with her again potential side effects. She will have updated lab tests at [facility] tomorrow and she will arrange for an ultrasound of the thyroid at [facility]. She will call for the results of her studies in several days and return here after that. She will meet with Dr. Melchor on September 14 for his opinion on her management. \par  \par \par \par

## 2023-03-27 NOTE — HISTORY OF PRESENT ILLNESS
[de-identified] : here for annual exam, doing well, surgery was not done because of anemia, she lost 4 lbs, she is taking now iron and folic acid pill, works night as a travel nurse

## 2023-03-28 LAB
25(OH)D3 SERPL-MCNC: 19.6 NG/ML
ANION GAP SERPL CALC-SCNC: 12 MMOL/L
APPEARANCE: CLEAR
BASOPHILS # BLD AUTO: 0.03 K/UL
BASOPHILS NFR BLD AUTO: 0.6 %
BILIRUBIN URINE: NEGATIVE
BLOOD URINE: NEGATIVE
BUN SERPL-MCNC: 10 MG/DL
CALCIUM SERPL-MCNC: 9.2 MG/DL
CHLORIDE SERPL-SCNC: 102 MMOL/L
CHOLEST SERPL-MCNC: 187 MG/DL
CO2 SERPL-SCNC: 24 MMOL/L
COLOR: NORMAL
CREAT SERPL-MCNC: 0.78 MG/DL
EGFR: 97 ML/MIN/1.73M2
EOSINOPHIL # BLD AUTO: 0.06 K/UL
EOSINOPHIL NFR BLD AUTO: 1.1 %
FERRITIN SERPL-MCNC: 46 NG/ML
GLUCOSE QUALITATIVE U: NEGATIVE
GLUCOSE SERPL-MCNC: 85 MG/DL
HCT VFR BLD CALC: 33.3 %
HDLC SERPL-MCNC: 59 MG/DL
HGB BLD-MCNC: 10.7 G/DL
IMM GRANULOCYTES NFR BLD AUTO: 0.2 %
KETONES URINE: NEGATIVE
LDLC SERPL CALC-MCNC: 118 MG/DL
LEUKOCYTE ESTERASE URINE: ABNORMAL
LYMPHOCYTES # BLD AUTO: 2.66 K/UL
LYMPHOCYTES NFR BLD AUTO: 49.6 %
MAN DIFF?: NORMAL
MCHC RBC-ENTMCNC: 29.4 PG
MCHC RBC-ENTMCNC: 32.1 GM/DL
MCV RBC AUTO: 91.5 FL
MONOCYTES # BLD AUTO: 0.4 K/UL
MONOCYTES NFR BLD AUTO: 7.5 %
NEUTROPHILS # BLD AUTO: 2.2 K/UL
NEUTROPHILS NFR BLD AUTO: 41 %
NITRITE URINE: NEGATIVE
NONHDLC SERPL-MCNC: 129 MG/DL
PH URINE: 6.5
PLATELET # BLD AUTO: 383 K/UL
POTASSIUM SERPL-SCNC: 4.1 MMOL/L
PROTEIN URINE: NEGATIVE
RBC # BLD: 3.64 M/UL
RBC # FLD: 13.6 %
SODIUM SERPL-SCNC: 138 MMOL/L
SPECIFIC GRAVITY URINE: 1
T4 FREE SERPL-MCNC: 1.6 NG/DL
TRIGL SERPL-MCNC: 54 MG/DL
TSH SERPL-ACNC: 1.51 UIU/ML
UROBILINOGEN URINE: NORMAL
WBC # FLD AUTO: 5.36 K/UL

## 2023-04-24 ENCOUNTER — APPOINTMENT (OUTPATIENT)
Dept: BARIATRICS/WEIGHT MGMT | Facility: CLINIC | Age: 43
End: 2023-04-24
Payer: MEDICAID

## 2023-04-24 VITALS
HEIGHT: 66 IN | WEIGHT: 174 LBS | DIASTOLIC BLOOD PRESSURE: 71 MMHG | RESPIRATION RATE: 16 BRPM | OXYGEN SATURATION: 98 % | BODY MASS INDEX: 27.97 KG/M2 | HEART RATE: 80 BPM | SYSTOLIC BLOOD PRESSURE: 109 MMHG

## 2023-04-24 DIAGNOSIS — E66.9 OBESITY, UNSPECIFIED: ICD-10-CM

## 2023-04-24 DIAGNOSIS — R63.5 ABNORMAL WEIGHT GAIN: ICD-10-CM

## 2023-04-24 PROCEDURE — 99215 OFFICE O/P EST HI 40 MIN: CPT

## 2023-04-24 PROCEDURE — 99205 OFFICE O/P NEW HI 60 MIN: CPT

## 2023-04-24 RX ORDER — DULAGLUTIDE 0.75 MG/.5ML
0.75 INJECTION, SOLUTION SUBCUTANEOUS
Qty: 4 | Refills: 2 | Status: ACTIVE | COMMUNITY
Start: 2023-04-24 | End: 1900-01-01

## 2023-04-24 RX ORDER — ORAL SEMAGLUTIDE 3 MG/1
3 TABLET ORAL
Qty: 30 | Refills: 1 | Status: ACTIVE | COMMUNITY
Start: 2023-04-24 | End: 1900-01-01

## 2023-04-24 RX ORDER — SEMAGLUTIDE 0.68 MG/ML
2 INJECTION, SOLUTION SUBCUTANEOUS
Qty: 3 | Refills: 0 | Status: DISCONTINUED | COMMUNITY
Start: 2023-03-15

## 2023-04-24 NOTE — ASSESSMENT
[FreeTextEntry1] : overweight, hypothyroidism secondary to total thyroidectomy due to Graves, vitamin d insufficiency, who presents to weight management for initial evaluation.\par \par # Obesity (body fat >30% in woman): Weight today 174 lbs, with body fat percentage of 39.6%. 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 She tried Ozempic with weight loss but was no longer covered. Her diet is noted for high kcal take-out (ie wings/pizza on night shift) and heavy hotel breakfasts (waffle, egg) prior to sleep. It is difficult to determine the ideal eating schedule. She is on her feet but no formal exercise (feels itchy when hot). Disrupted sleep, snores. \par - Food log\par - Meal planning/prep, ie bring home-prepped meal to work\par - Largest meal should be first meal (at "dinner time", ie 9-10 pm) as this is far from bedtime and not quite in overnight period. Minimize intake midnight - 6 am.\par - Stick to whole foods, lots of vegetables and fruits, some whole grain and legumes. Limit waffles/cereals\par - Goal for physical activity at least 2x/wk, at least 1x/wk in gym, and additional via walking.\par - elevated LDL, also she meets criteria for obesity through body fat % of ~40% (>30% obesity in females). Will prescribe incretin therapy (rybelsus, trulicity sent). Rybelsus ppr,  Metf.

## 2023-04-24 NOTE — PHYSICAL EXAM
[Obese, well nourished, in no acute distress] : obese, well nourished, in no acute distress [Normal] : no stigmata of Cushing Syndrome

## 2023-04-24 NOTE — HISTORY OF PRESENT ILLNESS
[FreeTextEntry1] : 43F PMH overweight, hypothyroidism secondary to total thyroidectomy due to Graves, vitamin d insufficiency, who presents to weight management for initial evaluation.\par \par Weight/Diet History:\par Her most significant gain has been over the past year, she states she has put on over 20 lbs during this time. She has had ups and downs in weight associated with weight loss attempts, reached her heaviest weight a few weeks ago at 180 lbs, has lost some since. She was approved for 1 syringe of Ozempic on which she lost weight but subsequently was not approved. 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 \par She engaged in a weight loss program in the past on which she took B12, vitamins, appetite suppressant (phentermine).\par \par Weight today: 174 lbs, BMI 28.08, BF% 39.6%\par \par Diet: Varies because of work schedule (overnight shifts), then schedule is off other days\par 9 pm: bowl of rice with fish and vegetables\par Snacks: yes throughout the night, wing/pizza ordered, tries not to eat it\par Maybe eats 3 meals at night\par Before sleep is hungry, has breakfast at hotel, has eggs and waffle before sleep\par Snack: sweets\par Beverages: Occasional soda, lemonade with equal, water.\par Night-time eating:\par Binging: \par Fast-food/Restaurants: Cutting down, now 1x/wk\par Cook/Prepare meals: fried\par Added oils:\par Food Journal: no\par Days not working she will only eat during the day.\par \par Exercise: On feet 12 hours shifts, no formal exercise. Itches when gets hot and sweaty.\par \par Sleep: Uses benadryl to sleep, will be broken if she doesn't take it. Works overnight 4 times per week, doesn’t work sleeps day and night. Snores. 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 Denies tobacco, alcohol or drug use. She lives with her , has children in school in [place].\par \par Denies family hx thyroid cancer.

## 2023-07-14 ENCOUNTER — APPOINTMENT (OUTPATIENT)
Dept: OBGYN | Facility: CLINIC | Age: 43
End: 2023-07-14

## 2024-05-13 ENCOUNTER — APPOINTMENT (OUTPATIENT)
Dept: OBGYN | Facility: CLINIC | Age: 44
End: 2024-05-13

## 2024-06-18 ENCOUNTER — APPOINTMENT (OUTPATIENT)
Dept: OBGYN | Facility: CLINIC | Age: 44
End: 2024-06-18

## 2024-07-10 ENCOUNTER — APPOINTMENT (OUTPATIENT)
Dept: BARIATRICS/WEIGHT MGMT | Facility: CLINIC | Age: 44
End: 2024-07-10
Payer: COMMERCIAL

## 2024-07-10 VITALS
WEIGHT: 155 LBS | DIASTOLIC BLOOD PRESSURE: 60 MMHG | HEIGHT: 66 IN | SYSTOLIC BLOOD PRESSURE: 122 MMHG | HEART RATE: 84 BPM | BODY MASS INDEX: 24.91 KG/M2

## 2024-07-10 DIAGNOSIS — E66.9 OBESITY, UNSPECIFIED: ICD-10-CM

## 2024-07-10 DIAGNOSIS — R63.5 ABNORMAL WEIGHT GAIN: ICD-10-CM

## 2024-07-10 PROCEDURE — G2211 COMPLEX E/M VISIT ADD ON: CPT

## 2024-07-10 PROCEDURE — 99204 OFFICE O/P NEW MOD 45 MIN: CPT

## 2024-07-10 RX ORDER — SEMAGLUTIDE 0.5 MG/.5ML
0.5 INJECTION, SOLUTION SUBCUTANEOUS
Qty: 1 | Refills: 1 | Status: ACTIVE | COMMUNITY
Start: 2024-07-10 | End: 1900-01-01

## 2024-07-22 ENCOUNTER — NON-APPOINTMENT (OUTPATIENT)
Age: 44
End: 2024-07-22

## 2024-07-23 ENCOUNTER — APPOINTMENT (OUTPATIENT)
Dept: FAMILY MEDICINE | Facility: CLINIC | Age: 44
End: 2024-07-23
Payer: COMMERCIAL

## 2024-07-23 ENCOUNTER — APPOINTMENT (OUTPATIENT)
Dept: ENDOCRINOLOGY | Facility: CLINIC | Age: 44
End: 2024-07-23
Payer: COMMERCIAL

## 2024-07-23 VITALS
DIASTOLIC BLOOD PRESSURE: 78 MMHG | WEIGHT: 153 LBS | HEART RATE: 76 BPM | SYSTOLIC BLOOD PRESSURE: 112 MMHG | HEIGHT: 66 IN | TEMPERATURE: 97.5 F | OXYGEN SATURATION: 98 % | BODY MASS INDEX: 24.59 KG/M2

## 2024-07-23 VITALS
SYSTOLIC BLOOD PRESSURE: 116 MMHG | OXYGEN SATURATION: 98 % | DIASTOLIC BLOOD PRESSURE: 80 MMHG | BODY MASS INDEX: 24.59 KG/M2 | WEIGHT: 153 LBS | HEART RATE: 84 BPM | HEIGHT: 66 IN

## 2024-07-23 DIAGNOSIS — E03.9 HYPOTHYROIDISM, UNSPECIFIED: ICD-10-CM

## 2024-07-23 DIAGNOSIS — Z12.31 ENCOUNTER FOR SCREENING MAMMOGRAM FOR MALIGNANT NEOPLASM OF BREAST: ICD-10-CM

## 2024-07-23 DIAGNOSIS — M25.561 PAIN IN RIGHT KNEE: ICD-10-CM

## 2024-07-23 DIAGNOSIS — E55.9 VITAMIN D DEFICIENCY, UNSPECIFIED: ICD-10-CM

## 2024-07-23 DIAGNOSIS — Z00.00 ENCOUNTER FOR GENERAL ADULT MEDICAL EXAMINATION W/OUT ABNORMAL FINDINGS: ICD-10-CM

## 2024-07-23 DIAGNOSIS — M25.562 PAIN IN RIGHT KNEE: ICD-10-CM

## 2024-07-23 DIAGNOSIS — E53.8 DEFICIENCY OF OTHER SPECIFIED B GROUP VITAMINS: ICD-10-CM

## 2024-07-23 LAB
BASOPHILS # BLD AUTO: 0.02 K/UL
BASOPHILS NFR BLD AUTO: 0.3 %
EOSINOPHIL # BLD AUTO: 0.13 K/UL
EOSINOPHIL NFR BLD AUTO: 2.1 %
HCT VFR BLD CALC: 32.7 %
HGB BLD-MCNC: 10.8 G/DL
IMM GRANULOCYTES NFR BLD AUTO: 0.3 %
LYMPHOCYTES # BLD AUTO: 2.7 K/UL
LYMPHOCYTES NFR BLD AUTO: 42.9 %
MAN DIFF?: NORMAL
MCHC RBC-ENTMCNC: 30.2 PG
MCHC RBC-ENTMCNC: 33 GM/DL
MCV RBC AUTO: 91.3 FL
MONOCYTES # BLD AUTO: 0.5 K/UL
MONOCYTES NFR BLD AUTO: 7.9 %
NEUTROPHILS # BLD AUTO: 2.92 K/UL
NEUTROPHILS NFR BLD AUTO: 46.5 %
PLATELET # BLD AUTO: 387 K/UL
RBC # BLD: 3.58 M/UL
RBC # FLD: 13.2 %
WBC # FLD AUTO: 6.29 K/UL

## 2024-07-23 PROCEDURE — 99215 OFFICE O/P EST HI 40 MIN: CPT | Mod: 25

## 2024-07-23 PROCEDURE — 36415 COLL VENOUS BLD VENIPUNCTURE: CPT

## 2024-07-23 PROCEDURE — 99386 PREV VISIT NEW AGE 40-64: CPT

## 2024-07-23 PROCEDURE — 99213 OFFICE O/P EST LOW 20 MIN: CPT | Mod: 25

## 2024-07-23 NOTE — HISTORY OF PRESENT ILLNESS
[FreeTextEntry1] : Annual and establish care  [de-identified] : 44-year-old female with a past medical history of Graves' disease status post thyroidectomy on levothyroxine now presenting for an annual physical exam and to establish care.  Patient was being followed by Dr. Raya before.  Patient follows with endocrinologist for thyroid.  Patient is also following with weight management for weight loss, she has used Ozempic and lost around 30 pounds so far.  Patient complains of bilateral knee pain that started 8 to 10 months ago.  She denies any injuries to the knee.  Patient is otherwise well.

## 2024-07-23 NOTE — ASSESSMENT
[FreeTextEntry1] : 44 year old female presented for an annual physical exam.  blood was done earlier today by endocrinologist, will add lipids to it.  up to date with screening referred for mammogram will call back with results.

## 2024-07-23 NOTE — HEALTH RISK ASSESSMENT
[Fair] :  ~his/her~ mood as fair [No falls in past year] : Patient reported no falls in the past year [0] : 2) Feeling down, depressed, or hopeless: Not at all (0) [PHQ-2 Negative - No further assessment needed] : PHQ-2 Negative - No further assessment needed [With Significant Other] : lives with significant other [# of Members in Household ___] :  household currently consist of [unfilled] member(s) [Employed] : employed [College] : College [] :  [# Of Children ___] : has [unfilled] children [Never] : Never [No] : In the past 12 months have you used drugs other than those required for medical reasons? No [Patient reported mammogram was normal] : Patient reported mammogram was normal [Patient reported PAP Smear was normal] : Patient reported PAP Smear was normal [HIV test declined] : HIV test declined [Hepatitis C test declined] : Hepatitis C test declined [None] : None [Fully functional (bathing, dressing, toileting, transferring, walking, feeding)] : Fully functional (bathing, dressing, toileting, transferring, walking, feeding) [Fully functional (using the telephone, shopping, preparing meals, housekeeping, doing laundry, using] : Fully functional and needs no help or supervision to perform IADLs (using the telephone, shopping, preparing meals, housekeeping, doing laundry, using transportation, managing medications and managing finances) [de-identified] : None [de-identified] : good [[id number]] : 0 [Reports changes in hearing] : Reports no changes in hearing [Reports changes in vision] : Reports no changes in vision [Reports normal functional visual acuity (ie: able to read med bottle)] : Reports normal functional visual acuity [MammogramDate] : 02/20 [MammogramComments] : Referred  [PapSmearDate] : 01/20 [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

## 2024-07-23 NOTE — HISTORY OF PRESENT ILLNESS
[FreeTextEntry1] : Jul 23, 2024      in person  PCP:   Dr. Nalini Noel    CC: 2016  Hyperthyroid (Graves' disease with ophthalmopathy)   11/9/2018: thyroidectomy (Dr. Melchor)  -> hypothyroid   89yo originally hyperthryoid and now visits for hypothyroidism post thyroidectomy at [facility] in 2018. Taking levothyroxine 137 mcg daily from [facility]. Works at [facility] [employer]. Labs at [facility] 2/10/23 included TSH 0.83  T4 9.2   : : Constitutional:  Alert, well nourished, healthy appearance, no acute distress  Eyes:  No proptosis, no stare Thyroid:  no palpable tissue Pulmonary:  No respiratory distress, no accessory muscle use; normal rate and effort Cardiac:  Normal rate Vascular:  Endocrine:  No stigmata of Cushings Syndrome Musculoskeletal:  Normal gait, no involuntary movements Neurology:  No tremors Affect/Mood/Psych:  Oriented x 3; normal affect, normal insight/judgement, normal mood  . Impression:  Surgical hypothyroidism has been well controlled on levothryoxine 137 mcg daily  History of low vit D    Plan:  TFTs today and ROV in May 2025     Mar 27, 2023     in person  PCP:   Dr. Nalini Noel (saw earlier today)    CC: 2016  Hyperthyroid (Graves' disease with ophthalmopathy)   11/9/2018: thyroidectomy (Dr. Melchor)  -> hypothyroid   42 yo originally hyperthryoid and now visits for hypothyroidism post thyroidectomy at [facility] in 2018. Taking levothyroxine 137 mcg daily from [facility]. Works at [employer] - on [employer] service - recently returned from trip to [place]. Most recent labs at [facility] 2/10/23 included TSH 0.83  T4 9.2   Recent trip to [place], home of [place]    Ran out of LT4 a week ago.    Renew 137 and repeat labs end of January and ROV in January  : : Constitutional:  Alert, well nourished, healthy appearance, no acute distress  Eyes:  No proptosis, no stare Thyroid: Pulmonary:  No respiratory distress, no accessory muscle use; normal rate and effort Cardiac:  Normal rate Vascular:  Endocrine:  No stigmata of Cushing's Syndrome Musculoskeletal:  Normal gait, no involuntary movements Neurology:  No tremors Affect/Mood/Psych:  Oriented x 3; normal affect, normal insight/judgement, normal mood  .  Impression:  Hypothyroidism well controlled.     May 20, 2020    VideoChat - Facetime to   PCP:  Dr. Nalini Noel  CC:  Hypothyroid post thyroidectomy for Graves Disease.  Feels well. TSH is elevated. Needs dose of levothyroxine increased.       Oct 23, 2020      Videochat  -   Recent trip to [place], [place] of [place]    Ran out of LT4 a week ago.    Renew 137 and repeat labs end of January and ROV in January   May 20, 2020    VideoChat - [employer] to   PCP:  Dr. Nalini Noel  CC:  Hypothyroid post thyroidectomy for Graves Disease.  Feels well. TSH is elevated. Needs dose of levothyroxine increased.    Rx sent to [facility] at [address] She will ROV end of July after [facility] TFTs a few days before      Apr 13, 2020  This is a 21+ minute Tele-Phonic encounter with an established patient which was initiated by the patient during a time scheduled for a visit and the patient is aware that this may be a billable encounter.  The patient has not seen a provider of my specialty (Endocrinology) within out group in the past 7 days and this encounter is not anticipated to result in a scheduled in-person visit within he next 7 days. The reason for this Tele-Phonic encounter is listed below under "CC:" Verbal consent was discussed and obtained from the patient for this visit:  "You have chosen to receive care through the use of tele-media or telephone.   This enables health care providers at different locations to provide safe, effective, and convenient care through the use of technology.  Please note this is a billable encounter.  As with any health care service, there are risks associated with the use of tele-media or telephone, including equipment failure, poor image and/or resolution, and  issues.  You understand that I cannot physically examine you and that you may need to come to the office to complete the assessment.  Patient agreed verbally to understanding the risks, benefits, alternatives of Tele-Media and telephone as explained.  All questions regarding tele-media and telephone encounters were answered.  Apr 13, 2020  .  CC: Hyperthyroid (Graves' disease with ophthalmopathy)   11/9/2018: thyroidectomy   Taking levothyroxine 137 mcg daily from [facility] [address] [place]. Feels well. Last set of TFTs July 2019 in good range.  Plan:  Updated labs at [facility] this week. Renew LT4 ROV 8 months.    July 29, 2019  PCP: Dr. Nalini Noel   Gyn: [facility]   Surgeon: Dr. Kyle Melchor   Eyes:  Dr. Berry Henderson - ref to glaucoma doctor as well as at [facility] for lid retraction OS and will go to [facility] Nov 8, 2019 .  CC: Hyperthyroid (Graves' disease with ophthalmopathy)   11/9/2018: thyroidectomy   40 yo working night shift at [facility] [employer].   Now taking levothyroxine 125 mcg daily. No double vision, blurry vision     Impression:  Thyroid surgery was November and by March TSH was 2.57 on levothyroxine 125 mcg daily.  Plan:  TSI, TBII, TFTs today and ROV in six months.         March 29, 2019   PCP: Dr. Nalini Noel (to see)  Gyn: [facility] (to see)  Surgeon: Dr. Kyle Melchor  .  CC: Hyperthyroid (Graves' disease with ophthalmopathy)   11/9/2018: thyroidectomy   On levothyroxine 125 mcg daily post thyroidectomy in setting of hyperthyroidism due to Graves' Disease with ophthalmopathy.   November 9, 2018:  "FINAL DIAGNOSIS   A. Delphian lymph node, excision: 	One benign reactive lymph node.  B. Thyroid gland, total thyroidectomy: 	Thyroid tissue showing areas of mild hyperplastic change, mild fibrosis and few  minute hyperplastic-type nodules, consistent with treated diffuse hyperplasia  (Graves disease).	 :"  Impression: Graves' disease with ophthalmopathy post thyroidectomy.  Plan: UPdated thyroid lab tests on April 1. See Dr. Berry Bah for her advice.  ROV here in about 8 weeks.      Previous notes from [employer] appended below.   October 19, 2018 .  PCP: Dr. Nalini Noel (to see)  Gyn: [facility] (to see)  Surgeon: Dr. Kyle Melchor  .  CC: Hyperthyroid (Graves' disease with ophthalmopathy)  .  37 yo on methimazole (started 7/19/2018) for hyperthyroidism due to Graves' disease. Because of her history of ophthalmopathy, she is not a good candidate for I-131 treatment and she is not likely to go into a prolonged remission from methimazole now that the condition has returned. Thus she met with Dr. Melchor for his opinion regarding thyroidectomy and she is scheduled for surgery on November 8.  .  Currently taking methimazole 10 mg daily. .  Impression: TFTs in good range, although recent TSH still suppressed. .  Plan: Increase the methimazole to 10 mg x 2 tabs daily for now until surgery. TFTs a few days before surgery and call me for results.  .  == .  Sept 14, 2018 .  PCP: Dr. Nalini Noel (to see)  Gyn: [facility] (to see)  Surgeon: Dr. Kyle Melchor (to see) .  Taking methimazole 10 mg daily. (started 7/19/2018)  Feels well.  Because of the eye symptoms from Graves' disease, she is not a good candidate for I-131 and she is not likely to go into a remission and she is interested in being considered for thyroid surgery.  She is scheduled to meet with Dr. Melchor later today.  .  Labs on  8/18 included T3 135 which is down to 108 by 9/9  T4 on 8/18 was 11 and is now 9.2 by 9/9  TSH remains suppressed. .  Impression: Doing well on methimazole 10 mg daily and propranolol 5 mg BID .  Plan: TFTs before next visit here in one month.  .  ==  /  August 17, 2018 .  PCP: Dr. Nalini Noel (to see)  Gyn: [facility] (to see) .  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  Born in [place], to  1999 to [place],   2008, , moved to [place]. .  2015 traveling back and forth between [facility] and [place].   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 Stopped working there and noted R sided proptosis. Saw ophthalmologist in [facility] and went to endocrinologist in [facility]. Put on prednisone, antithyroid medication and "BP" med. After a few months, her R eye was still proptotic.   Sought medical advice in [place] at [facility].  .  Saw Dr. Reji Mchugh at [facility]. Started on methimazole 10 mg daily and then tapered off and was able to stop about a year ago.  She underwent R eye surgery. by Dr. Brooks.. Dr. Brooks also lowered the eyelid - she did not want additional surgery.  .  Now she notices some prominence of Left eye and Dr. Brooks gave her botox to lower L lid.  .  She stopped into an [facility] in [facility], [address] p  and had TFTs about 3 weeks ago and was told that the overactivity had returned. TSH was 0.01, free T4 was 2.3 (0.8 - 1.8, total T3 162 ().  .  She has been taking methiamzole 10 mg daily since 7/19/2018. By Dr. Alonso Branch of the [facility]. .  Impression: She is aware that she is not a good candidate for I-131. She also knows that she is not likely to go into a prolonged remission from the hyperthyroidism from methimazole. Thus she says she is interested to obtain a surgical opinion. .  Plan: She will continue the methimazole 10 mg daily for now and I reviewed with her again potential side effects. She will have updated lab tests at [facility] tomorrow and she will arrange for an ultrasound of the thyroid at [facility]. She will call for the results of her studies in several days and return here after that. She will meet with Dr. Melchor on September 14 for his opinion on her management.

## 2024-07-24 LAB
25(OH)D3 SERPL-MCNC: 31.3 NG/ML
ALBUMIN SERPL ELPH-MCNC: 4.2 G/DL
ALP BLD-CCNC: 67 U/L
ALT SERPL-CCNC: 12 U/L
ANION GAP SERPL CALC-SCNC: 15 MMOL/L
AST SERPL-CCNC: 18 U/L
BILIRUB DIRECT SERPL-MCNC: 0 MG/DL
BILIRUB INDIRECT SERPL-MCNC: 0.1 MG/DL
BILIRUB SERPL-MCNC: <0.2 MG/DL
BUN SERPL-MCNC: 13 MG/DL
CALCIUM SERPL-MCNC: 9 MG/DL
CHLORIDE SERPL-SCNC: 105 MMOL/L
CHOLEST SERPL-MCNC: 179 MG/DL
CO2 SERPL-SCNC: 20 MMOL/L
CREAT SERPL-MCNC: 0.72 MG/DL
EGFR: 106 ML/MIN/1.73M2
GLUCOSE SERPL-MCNC: 82 MG/DL
HDLC SERPL-MCNC: 66 MG/DL
IRON SATN MFR SERPL: 9 %
IRON SERPL-MCNC: 31 UG/DL
LDLC SERPL CALC-MCNC: 103 MG/DL
NONHDLC SERPL-MCNC: 113 MG/DL
POTASSIUM SERPL-SCNC: 4.5 MMOL/L
PROT SERPL-MCNC: 6.8 G/DL
SODIUM SERPL-SCNC: 140 MMOL/L
T4 SERPL-MCNC: 10.8 UG/DL
TIBC SERPL-MCNC: 342 UG/DL
TRIGL SERPL-MCNC: 52 MG/DL
TSH SERPL-ACNC: 0.28 UIU/ML
UIBC SERPL-MCNC: 311 UG/DL
VIT B12 SERPL-MCNC: 751 PG/ML

## 2024-07-29 ENCOUNTER — NON-APPOINTMENT (OUTPATIENT)
Age: 44
End: 2024-07-29

## 2024-07-29 ENCOUNTER — RESULT REVIEW (OUTPATIENT)
Age: 44
End: 2024-07-29

## 2024-08-07 ENCOUNTER — RESULT REVIEW (OUTPATIENT)
Age: 44
End: 2024-08-07

## 2024-08-09 ENCOUNTER — TRANSCRIPTION ENCOUNTER (OUTPATIENT)
Age: 44
End: 2024-08-09

## 2024-08-16 ENCOUNTER — APPOINTMENT (OUTPATIENT)
Dept: OBGYN | Facility: CLINIC | Age: 44
End: 2024-08-16

## 2024-08-18 NOTE — PHYSICAL EXAM
[Obese, well nourished, in no acute distress] : obese, well nourished, in no acute distress [de-identified] : TEB visit

## 2024-08-18 NOTE — HISTORY OF PRESENT ILLNESS
[FreeTextEntry1] : 44F PMH overweight/obesity, hypothyroidism secondary to total thyroidectomy due to Graves, vitamin d insufficiency, who presents to weight management for follow-up.  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 She was prescribed Ozempic, at that time was weigth 188 lbs, BMI 30.3, lost some weight, but coverage discontinued. Her diet was noted for high kcal take-out (ie wings/pizza on night shift) and heavy hotel breakfasts (waffle, egg) prior to sleep. On her feet but no formal exercise. Disrupted sleep, snores.  We discussed lifestyle and dietary interventions. Meeting criteria for obesity via body fat percentage, and additionally for medical treatment in BMI >27 + comorbidity, she was prescribed Rybelsus.   Weight today: Weight at last visit (7/10/24): 155 lbs, BMI 25.02 Weight at Initial Visit (4/24/24): 174 lbs, BMI 28.08, BF% 39.6%  Medication: Was in [place] and was able to get Ozempic, took .5 for 1 month and then 1 mg for 2 months.  Got to 150 lbs, regaining some off the med. No side effects except some nausea at the start.   Diet:  (Varies because of work schedule (overnight shifts), then schedule is off other days 9 pm: bowl of rice with fish and vegetables Snacks: yes throughout the night, wing/pizza ordered, tries not to eat it Maybe eats 3 meals at night Before sleep is hungry, has breakfast at hotel, has eggs and waffle before sleep Snack: sweets Beverages: Occasional soda, lemonade with equal, water. Fast-food/Restaurants: Cutting down, now 1x/wk Cook/Prepare meals: fried Food Journal: no Days not working she will only eat during the day.  Exercise:  (On feet 12 hours shifts, no formal exercise. Itches when gets hot and sweaty.  Sleep:  (Uses benadryl to sleep, will be broken if she doesn't take it. Works overnight 4 times per week, doesn't work sleeps day and night. Snores.

## 2024-08-18 NOTE — ASSESSMENT
[FreeTextEntry1] : 44F PMH overweight, hypothyroidism secondary to total thyroidectomy due to Graves, vitamin d insufficiency, who presents to weight management for initial evaluation.  # Obesity (body fat >30% in woman): Weight today    155 lbs, BMI 25.02, down 19 lbs since last ~10 weeks ago, had lost more weight and regained a few pounds off of it. Took to 1 mg Ozempic, tolerated. Notes dietary improvements, more active. She has a history of obesity (BMI 30.3 prior to starting Ozempic initially, before our first visit), and was diagnosed with existing obesity at our first visit via elevated body fat %, she has responded very well to semaglutide (Ozempic) and would benefit by continuing therapy to treat her obesity w/Wegovy. - Food log - Meal planning/prep, ie bring home-prepped meal to work - Largest meal should be first meal (at "dinner time", ie 9-10 pm) as this is far from bedtime and not quite in overnight period. Minimize intake midnight - 6 am. - Stick to whole foods, lots of vegetables and fruits, some whole grain and legumes. Limit waffles/cereals - Goal for physical activity at least 2x/wk, at least 1x/wk in gym, and additional via walking. - Wegovy 0.5 mg/wk prescribed - F/u in 1 month

## 2024-08-19 ENCOUNTER — APPOINTMENT (OUTPATIENT)
Dept: BARIATRICS/WEIGHT MGMT | Facility: CLINIC | Age: 44
End: 2024-08-19

## 2024-08-23 ENCOUNTER — APPOINTMENT (OUTPATIENT)
Dept: BARIATRICS/WEIGHT MGMT | Facility: CLINIC | Age: 44
End: 2024-08-23

## 2024-08-24 PROBLEM — Z97.5 IUD (INTRAUTERINE DEVICE) IN PLACE: Status: ACTIVE | Noted: 2024-08-24

## 2024-08-24 PROBLEM — Z01.419 ENCOUNTER FOR GYNECOLOGICAL EXAMINATION WITHOUT ABNORMAL FINDING: Status: ACTIVE | Noted: 2018-12-05

## 2024-08-24 PROBLEM — R92.30 DENSE BREAST: Status: ACTIVE | Noted: 2018-12-05

## 2024-08-26 ENCOUNTER — APPOINTMENT (OUTPATIENT)
Dept: BARIATRICS/WEIGHT MGMT | Facility: CLINIC | Age: 44
End: 2024-08-26
Payer: COMMERCIAL

## 2024-08-26 VITALS — BODY MASS INDEX: 24.91 KG/M2 | WEIGHT: 155 LBS | HEIGHT: 66 IN

## 2024-08-26 DIAGNOSIS — E66.9 OBESITY, UNSPECIFIED: ICD-10-CM

## 2024-08-26 DIAGNOSIS — M25.562 PAIN IN RIGHT KNEE: ICD-10-CM

## 2024-08-26 DIAGNOSIS — M25.561 PAIN IN RIGHT KNEE: ICD-10-CM

## 2024-08-26 DIAGNOSIS — R63.5 ABNORMAL WEIGHT GAIN: ICD-10-CM

## 2024-08-26 PROCEDURE — 99214 OFFICE O/P EST MOD 30 MIN: CPT

## 2024-08-26 PROCEDURE — G2211 COMPLEX E/M VISIT ADD ON: CPT

## 2024-08-26 NOTE — ASSESSMENT
[FreeTextEntry1] : 44F PMH overweight, hypothyroidism secondary to total thyroidectomy due to Graves, vitamin d insufficiency, who presents to weight management for initial evaluation.  # Obesity (body fat >30% in woman): Weight today 155 lbs, BMI 25.02, she regained 5 lbs off med and lost it again since restarting semaglutide on Wegovy, maintains 19 lbs weight loss since initial visit. Doing well, no adverse effects.  - Food log - Meal planning/prep, ie bring home-prepped meal to work - Largest meal should be first meal (at "dinner time", ie 9-10 pm) as this is far from bedtime and not quite in overnight period. Minimize intake midnight - 6 am. - Stick to whole foods, lots of vegetables and fruits, some whole grain and legumes. Limit waffles/cereals - Goal for physical activity at least 2x/wk, at least 1x/wk in gym, and additional via walking. - Increase Wegovy to 1 mg/wk - F/u in 4-8 weeks

## 2024-08-26 NOTE — HISTORY OF PRESENT ILLNESS
[FreeTextEntry1] : 44F PMH overweight/obesity, hypothyroidism secondary to total thyroidectomy due to Graves, vitamin d insufficiency, who presents to weight management for follow-up.  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 She was prescribed Ozempic, at that time was weigth 188 lbs, BMI 30.3, lost some weight, but coverage discontinued. Her diet was noted for high kcal take-out (ie wings/pizza on night shift) and heavy hotel breakfasts (waffle, egg) prior to sleep. On her feet but no formal exercise. Disrupted sleep, snores.  We discussed lifestyle and dietary interventions. Meeting criteria for obesity via body fat percentage, and additionally for medical treatment in BMI >27 + comorbidity, she was prescribed Rybelsus.   Weight today: 155 lbs, BMI 25.02 Weight at last visit (7/10/24): 155 lbs, BMI 25.02 Weight at Initial Visit (4/24/24): 174 lbs, BMI 28.08, BF% 39.6%  Medication: She is taking Wegovy .5 mg/wk, no adverse effects She regained 5 lbs while off medication (was on Ozempic, transitioned to Wegovy and had to await prior auth process) and lost it again while on Wegovy 0.5 mg/wk.  Diet: Varies because of work schedule (overnight shifts), then schedule is off other days Maybe eats 3 meals at night Less snacking.  Getting proteins and vegetables.   Exercise: On feet 12 hours shifts, no formal exercise. Itches when gets hot and sweaty.  Sleep:  (Uses benadryl to sleep, will be broken if she doesn't take it. Works overnight 4 times per week, doesn't work sleeps day and night. Snores.

## 2024-08-27 ENCOUNTER — APPOINTMENT (OUTPATIENT)
Dept: OBGYN | Facility: CLINIC | Age: 44
End: 2024-08-27
Payer: COMMERCIAL

## 2024-08-27 VITALS
DIASTOLIC BLOOD PRESSURE: 70 MMHG | SYSTOLIC BLOOD PRESSURE: 120 MMHG | HEIGHT: 66 IN | WEIGHT: 155 LBS | BODY MASS INDEX: 24.91 KG/M2

## 2024-08-27 DIAGNOSIS — R92.30 DENSE BREASTS, UNSPECIFIED: ICD-10-CM

## 2024-08-27 DIAGNOSIS — Z01.419 ENCOUNTER FOR GYNECOLOGICAL EXAMINATION (GENERAL) (ROUTINE) W/OUT ABNORMAL FINDINGS: ICD-10-CM

## 2024-08-27 DIAGNOSIS — Z12.11 ENCOUNTER FOR SCREENING FOR MALIGNANT NEOPLASM OF COLON: ICD-10-CM

## 2024-08-27 DIAGNOSIS — D25.9 LEIOMYOMA OF UTERUS, UNSPECIFIED: ICD-10-CM

## 2024-08-27 DIAGNOSIS — Z97.5 PRESENCE OF (INTRAUTERINE) CONTRACEPTIVE DEVICE: ICD-10-CM

## 2024-08-27 DIAGNOSIS — Z12.31 ENCOUNTER FOR SCREENING MAMMOGRAM FOR MALIGNANT NEOPLASM OF BREAST: ICD-10-CM

## 2024-08-27 PROCEDURE — 99386 PREV VISIT NEW AGE 40-64: CPT

## 2024-08-27 PROCEDURE — 99459 PELVIC EXAMINATION: CPT

## 2024-08-27 NOTE — HISTORY OF PRESENT ILLNESS
[Regular Cycle Intervals] : periods have been regular [Currently Active] : currently active [Men] : men [Vaginal] : vaginal [No] : No [TextBox_4] :  (twin boys and a girl.  Also a pregnancy loss at 16 weeks) LMP 24 for gynecologic care.  History of fibroids.  Menses last ~4 days - not heavy by hx. SALVADOR - taking OTC iron.  Copper containing IUD inserted ~. Feels well. No gyn complaints. Normal bladder & bowel function. Pt advised to have a progestin IUD instead - due to replace soon. Patient grew up in [place] until age 18.  College at [employer]; Pt would rather be back in [place]. Importance of annual mmg + brst u/s d/w pt based on ZIA score. See 2020 pelvic u/s - fibrid uterus / IUD  [Mammogramdate] : 8/7/2024 [TextBox_19] : Mar lifetime risk: 17.8% with family history as noted.  Heterogeneously dense tissue.  Breast arterial calcification: Grade 0.  No mammographic evidence of malignancy. [BreastSonogramDate] : 8/7/2024 [TextBox_25] : Negative [PapSmeardate] : 1/15/2020 [TextBox_31] : neg; HPV neg [TextBox_43] : At age 45 yrs. [FreeTextEntry1] : 8/2/24 [FreeTextEntry3] : IUD

## 2024-08-27 NOTE — PHYSICAL EXAM
[Chaperone Present] : A chaperone was present in the examining room during all aspects of the physical examination [No Lymphadenopathy] : no lymphadenopathy [Nl Sphincter Tone] : normal sphincter tone [18653] : A chaperone was present during the pelvic exam. [FreeTextEntry2] : CHRISTEL Cabrera  [FreeTextEntry3] : No thyroid nodules [FreeTextEntry7] : Pfan scar.  Pelvic abdominal mass consistent with fibroid uterus. [Labia Majora] : normal [Labia Minora] : normal [Normal] : normal [Enlarged ___ wks] : enlarged [unfilled] ~Uweeks [Uterine Adnexae] : normal [FreeTextEntry5] : Pap done; IUD string not seen. [FreeTextEntry6] : fills the true pelvis - little mobility [FreeTextEntry9] : No masses

## 2024-08-27 NOTE — PHYSICAL EXAM
[Chaperone Present] : A chaperone was present in the examining room during all aspects of the physical examination [No Lymphadenopathy] : no lymphadenopathy [Nl Sphincter Tone] : normal sphincter tone [66252] : A chaperone was present during the pelvic exam. [FreeTextEntry2] : CHRISTEL Cabrera  [FreeTextEntry3] : No thyroid nodules [FreeTextEntry7] : Pfan scar.  Pelvic abdominal mass consistent with fibroid uterus. [Labia Majora] : normal [Labia Minora] : normal [Normal] : normal [Enlarged ___ wks] : enlarged [unfilled] ~Uweeks [Uterine Adnexae] : normal [FreeTextEntry5] : Pap done; IUD string not seen. [FreeTextEntry6] : fills the true pelvis - little mobility [FreeTextEntry9] : No masses

## 2024-09-03 LAB
CYTOLOGY CVX/VAG DOC THIN PREP: NORMAL
HPV HIGH+LOW RISK DNA PNL CVX: NOT DETECTED

## 2024-09-19 PROBLEM — Z30.430 ENCOUNTER FOR INSERTION OF PROGESTIN-RELEASING INTRAUTERINE CONTRACEPTIVE DEVICE (IUD): Status: ACTIVE | Noted: 2024-09-19

## 2024-09-19 RX ORDER — MISOPROSTOL 200 UG/1
200 TABLET ORAL
Qty: 1 | Refills: 2 | Status: ACTIVE | COMMUNITY
Start: 2024-09-19 | End: 1900-01-01

## 2024-09-24 ENCOUNTER — APPOINTMENT (OUTPATIENT)
Dept: OBGYN | Facility: CLINIC | Age: 44
End: 2024-09-24
Payer: COMMERCIAL

## 2024-09-24 DIAGNOSIS — Z30.430 ENCOUNTER FOR INSERTION OF INTRAUTERINE CONTRACEPTIVE DEVICE: ICD-10-CM

## 2024-09-24 DIAGNOSIS — Z97.5 PRESENCE OF (INTRAUTERINE) CONTRACEPTIVE DEVICE: ICD-10-CM

## 2024-09-24 PROCEDURE — 99213 OFFICE O/P EST LOW 20 MIN: CPT

## 2024-09-24 NOTE — PROCEDURE
[IUD Removal] : intrauterine device (IUD) removal [ IUD] :  IUD [Risks] : risks [Benefits] : benefits [Alternatives] : alternatives [Patient] : patient [Speculum Placed] : speculum placed [Nonvisualization of Strings] : nonvisualization of strings [Tolerated Well] : Patient tolerated the procedure well [No Complications] : no complications [de-identified] : Bimanual examination revealed a large posterior fibroid with overall uterine size approximating 10 to 11 weeks and the fundus presumed to be anterior.  Ultrasound tech, Cesilia Contreras, performed an abdominal ultrasound confirming that the fundus was anterior and the IUD in proper position.  A Graves speculum was placed and the cervix visualized and prepped with Betadine.  The patient reacted with pain when the tenaculum was placed and when the endocervical canal was gently dilated.  A Cytobrush followed by IUD hook were used within the endocervical canal without IUD string retrieval..  At that point the procedure was terminated. [FreeTextEntry6] : Patient was advised that the IUD removal/replacement could be attempted in the office under local anesthesia or through ambulatory surgery.  She will consider her options and schedule accordingly.

## 2024-09-24 NOTE — PROCEDURE
[IUD Removal] : intrauterine device (IUD) removal [ IUD] :  IUD [Risks] : risks [Benefits] : benefits [Alternatives] : alternatives [Patient] : patient [Speculum Placed] : speculum placed [Nonvisualization of Strings] : nonvisualization of strings [Tolerated Well] : Patient tolerated the procedure well [No Complications] : no complications [de-identified] : Bimanual examination revealed a large posterior fibroid with overall uterine size approximating 10 to 11 weeks and the fundus presumed to be anterior.  Ultrasound tech, Cesilia Contreras, performed an abdominal ultrasound confirming that the fundus was anterior and the IUD in proper position.  A Graves speculum was placed and the cervix visualized and prepped with Betadine.  The patient reacted with pain when the tenaculum was placed and when the endocervical canal was gently dilated.  A Cytobrush followed by IUD hook were used within the endocervical canal without IUD string retrieval..  At that point the procedure was terminated. [FreeTextEntry6] : Patient was advised that the IUD removal/replacement could be attempted in the office under local anesthesia or through ambulatory surgery.  She will consider her options and schedule accordingly.

## 2024-09-26 ENCOUNTER — APPOINTMENT (OUTPATIENT)
Dept: BARIATRICS/WEIGHT MGMT | Facility: CLINIC | Age: 44
End: 2024-09-26
Payer: COMMERCIAL

## 2024-09-26 DIAGNOSIS — E66.9 OBESITY, UNSPECIFIED: ICD-10-CM

## 2024-09-26 PROCEDURE — 99213 OFFICE O/P EST LOW 20 MIN: CPT

## 2024-09-26 PROCEDURE — G2211 COMPLEX E/M VISIT ADD ON: CPT

## 2024-09-26 NOTE — HISTORY OF PRESENT ILLNESS
[FreeTextEntry1] : 44F PMH overweight/obesity, hypothyroidism secondary to total thyroidectomy due to Graves, vitamin d insufficiency, who presents to weight management for follow-up.  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 She was prescribed Ozempic, at that time was weigth 188 lbs, BMI 30.3, lost some weight, but coverage discontinued. Her diet was noted for high kcal take-out (ie wings/pizza on night shift) and heavy hotel breakfasts (waffle, egg) prior to sleep. On her feet but no formal exercise. Disrupted sleep, snores.  We discussed lifestyle and dietary interventions. Meeting criteria for obesity via body fat percentage, and additionally for medical treatment in BMI >27 + comorbidity, she was prescribed Rybelsus.   Weight today: 155 lbs, BMI 25 Weight at last visit (8/26/24): 155 lbs, BMI 25.02 Weight (7/10/24): 155 lbs, BMI 25.02 Weight at Initial Visit (4/24/24): 174 lbs, BMI 28.08, BF% 39.6%  Medication: She is taking Wegovy 1 mg/wk, no adverse effects She regained 5 lbs while off medication (was on Ozempic, transitioned to Wegovy and had to await prior auth process) and lost it again while on Wegovy 0.5 mg/wk.  Diet: Eating 3 meals per day, healthy choices. Less snacking.  (Varies because of work schedule (overnight shifts), then schedule is off other days Maybe eats 3 meals at night Less snacking.  Getting proteins and vegetables.   Exercise: 1x/wk walk. Feels she hasn't been doing as well with this (On feet 12 hours shifts, no formal exercise. Itches when gets hot and sweaty.  Sleep: Feels it has been less because of increased work schedule recently.

## 2024-09-26 NOTE — ASSESSMENT
[FreeTextEntry1] : 44F PMH overweight, hypothyroidism secondary to total thyroidectomy due to Graves, vitamin d insufficiency, who presents to weight management for initial evaluation.  # Obesity (body fat >30% in woman): Weight today 155 lbs, BMI 25, weight has been stable, despite increase from .5 mg/wk to 1 mg/wk, no side effects, appetite increased. Has increased work hours which may affect exercise, sleep, and diet to some extent.  - Food log - Meal planning/prep, ie bring home-prepped meal to work - Largest meal should be first meal (at "dinner time", ie 9-10 pm) as this is far from bedtime and not quite in overnight period. Minimize intake midnight - 6 am. - Stick to whole foods, lots of vegetables and fruits, some whole grain and legumes. Limit waffles/cereals - Goal for physical activity at least 2x/wk, at least 1x/wk in gym, and additional via walking. - Increase Wegovy to 1.7 mg/wk - F/u in 2-3 mo

## 2024-09-26 NOTE — PHYSICAL EXAM
[Obese, well nourished, in no acute distress] : obese, well nourished, in no acute distress [de-identified] : TEB visit

## 2024-10-01 ENCOUNTER — TRANSCRIPTION ENCOUNTER (OUTPATIENT)
Age: 44
End: 2024-10-01

## 2024-10-07 ENCOUNTER — NON-APPOINTMENT (OUTPATIENT)
Age: 44
End: 2024-10-07

## 2024-11-15 ENCOUNTER — APPOINTMENT (OUTPATIENT)
Dept: BARIATRICS/WEIGHT MGMT | Facility: CLINIC | Age: 44
End: 2024-11-15
Payer: COMMERCIAL

## 2024-11-15 VITALS — WEIGHT: 153 LBS | HEIGHT: 66 IN | BODY MASS INDEX: 24.59 KG/M2

## 2024-11-15 DIAGNOSIS — E66.9 OBESITY, UNSPECIFIED: ICD-10-CM

## 2024-11-15 DIAGNOSIS — R63.5 ABNORMAL WEIGHT GAIN: ICD-10-CM

## 2024-11-15 PROCEDURE — 99214 OFFICE O/P EST MOD 30 MIN: CPT

## 2024-11-15 PROCEDURE — G2211 COMPLEX E/M VISIT ADD ON: CPT

## 2024-11-19 ENCOUNTER — APPOINTMENT (OUTPATIENT)
Dept: OBGYN | Facility: CLINIC | Age: 44
End: 2024-11-19
Payer: COMMERCIAL

## 2024-11-19 VITALS
WEIGHT: 155 LBS | HEIGHT: 66 IN | SYSTOLIC BLOOD PRESSURE: 115 MMHG | DIASTOLIC BLOOD PRESSURE: 70 MMHG | BODY MASS INDEX: 24.91 KG/M2

## 2024-11-19 DIAGNOSIS — D25.9 LEIOMYOMA OF UTERUS, UNSPECIFIED: ICD-10-CM

## 2024-11-19 DIAGNOSIS — Z97.5 PRESENCE OF (INTRAUTERINE) CONTRACEPTIVE DEVICE: ICD-10-CM

## 2024-11-19 DIAGNOSIS — N92.0 EXCESSIVE AND FREQUENT MENSTRUATION WITH REGULAR CYCLE: ICD-10-CM

## 2024-11-19 PROCEDURE — 99213 OFFICE O/P EST LOW 20 MIN: CPT

## 2024-12-03 ENCOUNTER — TRANSCRIPTION ENCOUNTER (OUTPATIENT)
Age: 44
End: 2024-12-03

## 2024-12-16 ENCOUNTER — EMERGENCY (EMERGENCY)
Facility: HOSPITAL | Age: 44
LOS: 0 days | Discharge: HOME | End: 2024-12-16
Attending: EMERGENCY MEDICINE
Payer: COMMERCIAL

## 2024-12-16 VITALS
SYSTOLIC BLOOD PRESSURE: 119 MMHG | OXYGEN SATURATION: 100 % | DIASTOLIC BLOOD PRESSURE: 81 MMHG | TEMPERATURE: 98 F | RESPIRATION RATE: 18 BRPM | HEART RATE: 88 BPM

## 2024-12-16 VITALS
OXYGEN SATURATION: 100 % | WEIGHT: 149.91 LBS | SYSTOLIC BLOOD PRESSURE: 113 MMHG | DIASTOLIC BLOOD PRESSURE: 79 MMHG | HEIGHT: 67 IN | RESPIRATION RATE: 19 BRPM | HEART RATE: 80 BPM | TEMPERATURE: 98 F

## 2024-12-16 DIAGNOSIS — E89.0 POSTPROCEDURAL HYPOTHYROIDISM: Chronic | ICD-10-CM

## 2024-12-16 DIAGNOSIS — R55 SYNCOPE AND COLLAPSE: ICD-10-CM

## 2024-12-16 DIAGNOSIS — R53.83 OTHER FATIGUE: ICD-10-CM

## 2024-12-16 DIAGNOSIS — E03.9 HYPOTHYROIDISM, UNSPECIFIED: ICD-10-CM

## 2024-12-16 DIAGNOSIS — Z88.6 ALLERGY STATUS TO ANALGESIC AGENT: ICD-10-CM

## 2024-12-16 DIAGNOSIS — Z88.8 ALLERGY STATUS TO OTHER DRUGS, MEDICAMENTS AND BIOLOGICAL SUBSTANCES: ICD-10-CM

## 2024-12-16 LAB
ALBUMIN SERPL ELPH-MCNC: 4.1 G/DL — SIGNIFICANT CHANGE UP (ref 3.5–5.2)
ALP SERPL-CCNC: 59 U/L — SIGNIFICANT CHANGE UP (ref 30–115)
ALT FLD-CCNC: 9 U/L — SIGNIFICANT CHANGE UP (ref 0–41)
ANION GAP SERPL CALC-SCNC: 10 MMOL/L — SIGNIFICANT CHANGE UP (ref 7–14)
AST SERPL-CCNC: 13 U/L — SIGNIFICANT CHANGE UP (ref 0–41)
BASOPHILS # BLD AUTO: 0.03 K/UL — SIGNIFICANT CHANGE UP (ref 0–0.2)
BASOPHILS NFR BLD AUTO: 0.6 % — SIGNIFICANT CHANGE UP (ref 0–1)
BILIRUB SERPL-MCNC: <0.2 MG/DL — SIGNIFICANT CHANGE UP (ref 0.2–1.2)
BUN SERPL-MCNC: 13 MG/DL — SIGNIFICANT CHANGE UP (ref 10–20)
CALCIUM SERPL-MCNC: 9.2 MG/DL — SIGNIFICANT CHANGE UP (ref 8.4–10.5)
CHLORIDE SERPL-SCNC: 101 MMOL/L — SIGNIFICANT CHANGE UP (ref 98–110)
CO2 SERPL-SCNC: 26 MMOL/L — SIGNIFICANT CHANGE UP (ref 17–32)
CREAT SERPL-MCNC: 0.7 MG/DL — SIGNIFICANT CHANGE UP (ref 0.7–1.5)
EGFR: 109 ML/MIN/1.73M2 — SIGNIFICANT CHANGE UP
EOSINOPHIL # BLD AUTO: 0.1 K/UL — SIGNIFICANT CHANGE UP (ref 0–0.7)
EOSINOPHIL NFR BLD AUTO: 1.9 % — SIGNIFICANT CHANGE UP (ref 0–8)
GLUCOSE SERPL-MCNC: 76 MG/DL — SIGNIFICANT CHANGE UP (ref 70–99)
HCG SERPL QL: NEGATIVE — SIGNIFICANT CHANGE UP
HCT VFR BLD CALC: 30.3 % — LOW (ref 37–47)
HGB BLD-MCNC: 10.1 G/DL — LOW (ref 12–16)
IMM GRANULOCYTES NFR BLD AUTO: 0.2 % — SIGNIFICANT CHANGE UP (ref 0.1–0.3)
LYMPHOCYTES # BLD AUTO: 2.37 K/UL — SIGNIFICANT CHANGE UP (ref 1.2–3.4)
LYMPHOCYTES # BLD AUTO: 44.1 % — SIGNIFICANT CHANGE UP (ref 20.5–51.1)
MCHC RBC-ENTMCNC: 30.1 PG — SIGNIFICANT CHANGE UP (ref 27–31)
MCHC RBC-ENTMCNC: 33.3 G/DL — SIGNIFICANT CHANGE UP (ref 32–37)
MCV RBC AUTO: 90.2 FL — SIGNIFICANT CHANGE UP (ref 81–99)
MONOCYTES # BLD AUTO: 0.4 K/UL — SIGNIFICANT CHANGE UP (ref 0.1–0.6)
MONOCYTES NFR BLD AUTO: 7.4 % — SIGNIFICANT CHANGE UP (ref 1.7–9.3)
NEUTROPHILS # BLD AUTO: 2.46 K/UL — SIGNIFICANT CHANGE UP (ref 1.4–6.5)
NEUTROPHILS NFR BLD AUTO: 45.8 % — SIGNIFICANT CHANGE UP (ref 42.2–75.2)
NRBC # BLD: 0 /100 WBCS — SIGNIFICANT CHANGE UP (ref 0–0)
PLATELET # BLD AUTO: 401 K/UL — HIGH (ref 130–400)
PMV BLD: 9 FL — SIGNIFICANT CHANGE UP (ref 7.4–10.4)
POTASSIUM SERPL-MCNC: 3.7 MMOL/L — SIGNIFICANT CHANGE UP (ref 3.5–5)
POTASSIUM SERPL-SCNC: 3.7 MMOL/L — SIGNIFICANT CHANGE UP (ref 3.5–5)
PROT SERPL-MCNC: 6.4 G/DL — SIGNIFICANT CHANGE UP (ref 6–8)
RBC # BLD: 3.36 M/UL — LOW (ref 4.2–5.4)
RBC # FLD: 12.9 % — SIGNIFICANT CHANGE UP (ref 11.5–14.5)
SODIUM SERPL-SCNC: 137 MMOL/L — SIGNIFICANT CHANGE UP (ref 135–146)
TROPONIN T, HIGH SENSITIVITY RESULT: <6 NG/L — SIGNIFICANT CHANGE UP (ref 6–13)
WBC # BLD: 5.37 K/UL — SIGNIFICANT CHANGE UP (ref 4.8–10.8)
WBC # FLD AUTO: 5.37 K/UL — SIGNIFICANT CHANGE UP (ref 4.8–10.8)

## 2024-12-16 PROCEDURE — 93005 ELECTROCARDIOGRAM TRACING: CPT

## 2024-12-16 PROCEDURE — 84484 ASSAY OF TROPONIN QUANT: CPT

## 2024-12-16 PROCEDURE — 80053 COMPREHEN METABOLIC PANEL: CPT

## 2024-12-16 PROCEDURE — 93010 ELECTROCARDIOGRAM REPORT: CPT

## 2024-12-16 PROCEDURE — 84703 CHORIONIC GONADOTROPIN ASSAY: CPT

## 2024-12-16 PROCEDURE — 36000 PLACE NEEDLE IN VEIN: CPT

## 2024-12-16 PROCEDURE — 36415 COLL VENOUS BLD VENIPUNCTURE: CPT

## 2024-12-16 PROCEDURE — 99283 EMERGENCY DEPT VISIT LOW MDM: CPT | Mod: 25

## 2024-12-16 PROCEDURE — 99285 EMERGENCY DEPT VISIT HI MDM: CPT

## 2024-12-16 PROCEDURE — 85025 COMPLETE CBC W/AUTO DIFF WBC: CPT

## 2024-12-16 RX ORDER — 0.9 % SODIUM CHLORIDE 0.9 %
1000 INTRAVENOUS SOLUTION INTRAVENOUS ONCE
Refills: 0 | Status: COMPLETED | OUTPATIENT
Start: 2024-12-16 | End: 2024-12-16

## 2024-12-16 RX ORDER — ACETAMINOPHEN 500MG 500 MG/1
650 TABLET, COATED ORAL ONCE
Refills: 0 | Status: COMPLETED | OUTPATIENT
Start: 2024-12-16 | End: 2024-12-16

## 2024-12-16 RX ADMIN — ACETAMINOPHEN 500MG 650 MILLIGRAM(S): 500 TABLET, COATED ORAL at 18:26

## 2024-12-16 RX ADMIN — Medication 1000 MILLILITER(S): at 18:26

## 2024-12-16 NOTE — ED PROVIDER NOTE - PATIENT PORTAL LINK FT
You can access the FollowMyHealth Patient Portal offered by [facility] by registering at the following website: [email or web address]. By joining [facility]’s FollowMyHealth portal, you will also be able to view your health information using other applications (apps) compatible with our system.

## 2024-12-16 NOTE — ED PROVIDER NOTE - CHIEF COMPLAINT
Letter by Amy Donaldson MD at      Author: Amy Donaldson MD Service: -- Author Type: --    Filed:  Encounter Date: 10/12/2020 Status: (Other)         Glenys Garcia  [address]  [place] [address]             October 12, 2020         Dear Ms. Garcia,    Below are the results from your recent visit:       Your colonoscopy can be repeated in 1 year. See your report from [facility] for details of the  Findings.    Please call with questions or contact us using [employer].    Sincerely,        Electronically signed by Amy Donaldson MD       
The patient is a 44y Female complaining of syncope.

## 2024-12-16 NOTE — ED PROVIDER NOTE - DIFFERENTIAL DIAGNOSIS
Syncope assess for cardiac etiology vs infection vs dehydration vs anemia vs electrolyte abnormality vs other metabolic derangement. Differential Diagnosis

## 2024-12-16 NOTE — ED ADULT TRIAGE NOTE - CHIEF COMPLAINT QUOTE
c/o syncopal episode at home witnessed by . denies trauma. c/o blurry vision x 3 days c/o syncopal episode at home witnessed by . denies trauma. c/o blurry vision x 3 days "eugenio been working a lot lately" as per pt

## 2024-12-16 NOTE — ED PROVIDER NOTE - NSFOLLOWUPINSTRUCTIONS_ED_ALL_ED_FT
Please follow up with your primary care doctor in 1-3 days  Please be aware of any new or worsening signs or symptoms that should prompt your return to the ER.      You were noted to have what is called, a syncopal episode, which is an event when you temporarily lose consciousness. These events happen for a variety of reasons and you were kept in the hospital to evaluate what the cause of your event was. Thankfully, these events in themselves do not leave any long lasting effects on your body, however, they may happen again if the cause of the problem is not found and treated if need be. Many people never find out what caused their event. If you have been advised to follow up with any doctors, or specialists, please be sure to do so.     Dizziness  Dizziness is a common problem. It is a feeling of unsteadiness or light-headedness. You may feel like you are about to faint. Dizziness can lead to injury if you stumble or fall. Anyone can become dizzy, but dizziness is more common in older adults. This condition can be caused by a number of things, including medicines, dehydration, or illness.    Follow these instructions at home:  Eating and drinking     Drink enough fluid to keep your urine clear or pale yellow. This helps to keep you from becoming dehydrated. Try to drink more clear fluids, such as water.  Do not drink alcohol.  Limit your caffeine intake if told to do so by your health care provider. Check ingredients and nutrition facts to see if a food or beverage contains caffeine.  Limit your salt (sodium) intake if told to do so by your health care provider. Check ingredients and nutrition facts to see if a food or beverage contains sodium.  Activity     Avoid making quick movements.  Rise slowly from chairs and steady yourself until you feel okay.  In the morning, first sit up on the side of the bed. When you feel okay, stand slowly while you hold onto something until you know that your balance is fine.  If you need to  one place for a long time, move your legs often. Tighten and relax the muscles in your legs while you are standing.  Do not drive or use heavy machinery if you feel dizzy.  Avoid bending down if you feel dizzy. Place items in your home so that they are easy for you to reach without leaning over.  Lifestyle     Do not use any products that contain nicotine or tobacco, such as cigarettes and e-cigarettes. If you need help quitting, ask your health care provider.  Try to reduce your stress level by using methods such as yoga or meditation. Talk with your health care provider if you need help to manage your stress.  General instructions     Watch your dizziness for any changes.  Take over-the-counter and prescription medicines only as told by your health care provider. Talk with your health care provider if you think that your dizziness is caused by a medicine that you are taking.  Tell a friend or a family member that you are feeling dizzy. If he or she notices any changes in your behavior, have this person call your health care provider.  Keep all follow-up visits as told by your health care provider. This is important.  Contact a health care provider if:  Your dizziness does not go away.  Your dizziness or light-headedness gets worse.  You feel nauseous.  You have reduced hearing.  You have new symptoms.  You are unsteady on your feet or you feel like the room is spinning.  Get help right away if:  You vomit or have diarrhea and are unable to eat or drink anything.  You have problems talking, walking, swallowing, or using your arms, hands, or legs.  You feel generally weak.  You are not thinking clearly or you have trouble forming sentences. It may take a friend or family member to notice this.  You have chest pain, abdominal pain, shortness of breath, or sweating.  Your vision changes.  You have any bleeding.  You have a severe headache.  You have neck pain or a stiff neck.  You have a fever.  These symptoms may represent a serious problem that is an emergency. Do not wait to see if the symptoms will go away. Get medical help right away. Call your local emergency services (911 in the [place] Do not drive yourself to the hospital.     Summary  Dizziness is a feeling of unsteadiness or light-headedness. This condition can be caused by a number of things, including medicines, dehydration, or illness.  Anyone can become dizzy, but dizziness is more common in older adults.  Drink enough fluid to keep your urine clear or pale yellow. Do not drink alcohol.  Avoid making quick movements if you feel dizzy. Monitor your dizziness for any changes.  This information is not intended to replace advice given to you by your health care provider. Make sure you discuss any questions you have with your health care provider.

## 2024-12-16 NOTE — ED ADULT NURSE NOTE - CHIEF COMPLAINT QUOTE
c/o syncopal episode at home witnessed by . denies trauma. c/o blurry vision x 3 days "eugenio been working a lot lately" as per pt

## 2024-12-16 NOTE — ED PROVIDER NOTE - CLINICAL SUMMARY MEDICAL DECISION MAKING FREE TEXT BOX
Patient presented status post syncopal episode as documented.  Patient states that she has been eating overall but last due to increased workload at home and states that she thinks that this is the etiology.  On arrival, patient otherwise afebrile, hemodynamically stable, neurovascularly intact, no evidence of trauma, asymptomatic.  EKG obtained and nonischemic.  Obtained labs which were grossly unremarkable including no significant leukocytosis, anemia, signs of dehydration/LARRY, transaminitis or significant electrolyte abnormalities.  Troponin negative.  Patient remained stable during ED course, ambulatory without difficulty, tolerating p.o.  Given the above, will discharge home with outpatient follow up. Patient agreeable with plan. Agrees to return to ED for any new or worsening symptoms.
Statement Selected

## 2024-12-16 NOTE — ED PROVIDER NOTE - OBJECTIVE STATEMENT
44-year-old female with past medical history of hypothyroidism on Synthroid who presents for syncopal episode.  Reports she has been feeling fatigued for the past few days has not been eating well.  Thinks it is related to her increased workload.  She had a witnessed a couple episode that lasted a few seconds.  No preceding dizziness, chest pain, difficulty breathing, headache.  Not on anticoagulation.  Denies alcohol use, substance use, tobacco use.

## 2024-12-16 NOTE — ED PROVIDER NOTE - PROGRESS NOTE DETAILS
ck: patient feels well, results thoroughly discussed. patient educated on signs and symptoms to be aware of that should prompt return to the er. patient verbalizes understanding and is agreeable with plan.

## 2024-12-16 NOTE — ED PROVIDER NOTE - CARE PROVIDER_API CALL
Franko Odom  Family Medicine  [address] [place] [id number]  Phone: [phone]  Fax: [phone]  Follow Up Time: 4-6 Days

## 2024-12-16 NOTE — ED PROVIDER NOTE - CONSIDERATION OF ADMISSION OBSERVATION
Consideration of Admission/Observation Admission/observation considered for this patient but decision made to discharge at this time. Patient with benign serial exams, work up including all labs and imaging not concerning for emergent etiology of patient's symptoms, patient improved significantly with treatment in the ED, and patient deemed a safe discharge.

## 2024-12-23 ENCOUNTER — RESULT REVIEW (OUTPATIENT)
Age: 44
End: 2024-12-23

## 2025-01-08 ENCOUNTER — RESULT REVIEW (OUTPATIENT)
Age: 45
End: 2025-01-08

## 2025-01-08 ENCOUNTER — TRANSCRIPTION ENCOUNTER (OUTPATIENT)
Age: 45
End: 2025-01-08

## 2025-01-08 ENCOUNTER — APPOINTMENT (OUTPATIENT)
Dept: OBGYN | Facility: HOSPITAL | Age: 45
End: 2025-01-08

## 2025-01-22 ENCOUNTER — APPOINTMENT (OUTPATIENT)
Dept: GASTROENTEROLOGY | Facility: CLINIC | Age: 45
End: 2025-01-22
Payer: COMMERCIAL

## 2025-01-22 ENCOUNTER — NON-APPOINTMENT (OUTPATIENT)
Age: 45
End: 2025-01-22

## 2025-01-22 VITALS
SYSTOLIC BLOOD PRESSURE: 120 MMHG | HEIGHT: 66 IN | HEART RATE: 74 BPM | OXYGEN SATURATION: 99 % | DIASTOLIC BLOOD PRESSURE: 70 MMHG | WEIGHT: 150 LBS | BODY MASS INDEX: 24.11 KG/M2

## 2025-01-22 DIAGNOSIS — Z12.11 ENCOUNTER FOR SCREENING FOR MALIGNANT NEOPLASM OF COLON: ICD-10-CM

## 2025-01-22 DIAGNOSIS — N92.0 EXCESSIVE AND FREQUENT MENSTRUATION WITH REGULAR CYCLE: ICD-10-CM

## 2025-01-22 DIAGNOSIS — D50.9 IRON DEFICIENCY ANEMIA, UNSPECIFIED: ICD-10-CM

## 2025-01-22 PROCEDURE — 99204 OFFICE O/P NEW MOD 45 MIN: CPT

## 2025-01-29 ENCOUNTER — RX RENEWAL (OUTPATIENT)
Age: 45
End: 2025-01-29

## 2025-02-03 ENCOUNTER — ASOB RESULT (OUTPATIENT)
Age: 45
End: 2025-02-03

## 2025-02-03 ENCOUNTER — APPOINTMENT (OUTPATIENT)
Dept: OBGYN | Facility: CLINIC | Age: 45
End: 2025-02-03
Payer: COMMERCIAL

## 2025-02-03 VITALS
DIASTOLIC BLOOD PRESSURE: 64 MMHG | HEIGHT: 66 IN | BODY MASS INDEX: 23.63 KG/M2 | SYSTOLIC BLOOD PRESSURE: 110 MMHG | WEIGHT: 147 LBS

## 2025-02-03 DIAGNOSIS — N92.0 EXCESSIVE AND FREQUENT MENSTRUATION WITH REGULAR CYCLE: ICD-10-CM

## 2025-02-03 DIAGNOSIS — Z97.5 PRESENCE OF (INTRAUTERINE) CONTRACEPTIVE DEVICE: ICD-10-CM

## 2025-02-03 DIAGNOSIS — D25.9 LEIOMYOMA OF UTERUS, UNSPECIFIED: ICD-10-CM

## 2025-02-03 PROCEDURE — 76830 TRANSVAGINAL US NON-OB: CPT

## 2025-02-03 PROCEDURE — 99213 OFFICE O/P EST LOW 20 MIN: CPT

## 2025-02-13 ENCOUNTER — APPOINTMENT (OUTPATIENT)
Dept: OBGYN | Facility: CLINIC | Age: 45
End: 2025-02-13

## 2025-02-24 ENCOUNTER — APPOINTMENT (OUTPATIENT)
Dept: BARIATRICS/WEIGHT MGMT | Facility: CLINIC | Age: 45
End: 2025-02-24

## 2025-02-24 DIAGNOSIS — M25.561 PAIN IN RIGHT KNEE: ICD-10-CM

## 2025-02-24 DIAGNOSIS — E66.9 OBESITY, UNSPECIFIED: ICD-10-CM

## 2025-02-24 DIAGNOSIS — M25.562 PAIN IN RIGHT KNEE: ICD-10-CM

## 2025-02-24 DIAGNOSIS — R63.5 ABNORMAL WEIGHT GAIN: ICD-10-CM

## 2025-02-27 ENCOUNTER — APPOINTMENT (OUTPATIENT)
Dept: BARIATRICS/WEIGHT MGMT | Facility: CLINIC | Age: 45
End: 2025-02-27

## 2025-03-12 ENCOUNTER — APPOINTMENT (OUTPATIENT)
Dept: BARIATRICS/WEIGHT MGMT | Facility: CLINIC | Age: 45
End: 2025-03-12
Payer: COMMERCIAL

## 2025-03-12 VITALS — BODY MASS INDEX: 23.63 KG/M2 | HEIGHT: 66 IN | WEIGHT: 147 LBS

## 2025-03-12 DIAGNOSIS — M25.561 PAIN IN RIGHT KNEE: ICD-10-CM

## 2025-03-12 DIAGNOSIS — E55.9 VITAMIN D DEFICIENCY, UNSPECIFIED: ICD-10-CM

## 2025-03-12 DIAGNOSIS — E66.9 OBESITY, UNSPECIFIED: ICD-10-CM

## 2025-03-12 DIAGNOSIS — R63.5 ABNORMAL WEIGHT GAIN: ICD-10-CM

## 2025-03-12 DIAGNOSIS — M25.562 PAIN IN RIGHT KNEE: ICD-10-CM

## 2025-03-12 PROCEDURE — 99214 OFFICE O/P EST MOD 30 MIN: CPT | Mod: 95

## 2025-03-13 ENCOUNTER — RX RENEWAL (OUTPATIENT)
Age: 45
End: 2025-03-13

## 2025-08-15 ENCOUNTER — APPOINTMENT (OUTPATIENT)
Dept: BARIATRICS/WEIGHT MGMT | Facility: CLINIC | Age: 45
End: 2025-08-15

## 2025-08-15 DIAGNOSIS — M25.562 PAIN IN RIGHT KNEE: ICD-10-CM

## 2025-08-15 DIAGNOSIS — M25.561 PAIN IN RIGHT KNEE: ICD-10-CM

## 2025-08-15 DIAGNOSIS — E66.9 OBESITY, UNSPECIFIED: ICD-10-CM

## 2025-08-26 ENCOUNTER — RX RENEWAL (OUTPATIENT)
Age: 45
End: 2025-08-26